# Patient Record
Sex: MALE | Race: OTHER | HISPANIC OR LATINO | Employment: PART TIME | URBAN - METROPOLITAN AREA
[De-identification: names, ages, dates, MRNs, and addresses within clinical notes are randomized per-mention and may not be internally consistent; named-entity substitution may affect disease eponyms.]

---

## 2017-11-28 ENCOUNTER — OFFICE VISIT (OUTPATIENT)
Dept: URGENT CARE | Facility: MEDICAL CENTER | Age: 22
End: 2017-11-28
Payer: COMMERCIAL

## 2017-11-28 PROCEDURE — 94640 AIRWAY INHALATION TREATMENT: CPT

## 2017-11-28 PROCEDURE — G0382 LEV 3 HOSP TYPE B ED VISIT: HCPCS

## 2017-11-29 NOTE — PROGRESS NOTES
Assessment    1  Asthma (493 90) (J45 909)   2  Mild asthma with exacerbation, unspecified whether persistent (493 92) (J45 904)    Plan  Asthma    · Albuterol Sulfate (2 5 MG/3ML) 0 083% Inhalation Nebulization Solution   · PredniSONE 20 MG Oral Tablet   · Sodium Chloride 0 9 % Inhalation Nebulization Solution  Mild asthma with exacerbation, unspecified whether persistent    · PredniSONE 20 MG Oral Tablet; TAKE 3 TABLETS DAILY FOR 3 DAYS, THEN 2TABLETS DAILY FOR 3 DAYS, THEN 1 TABLET DAILY FOR 3 DAYS   · Ventolin  (90 Base) MCG/ACT Inhalation Aerosol Solution; INHALE 1 TO 2PUFFS EVERY 4 TO 6 HOURS AS NEEDED    Discussion/Summary  Discussion Summary:   Patient given albuterol nebulizer treatment in the office, prednisone 40 mg dose  Patient refers to some symptomatic improvement  Re auscultation reveals increased air entry I prescribed Ventolin inhaler 1-2 puffs q 4-6 hours as needed and started on tapering dose of prednisone starting at 60 mg down to 20 mg over 9 days  Strongly advised patient to follow up primary care provider symptoms worsen or go to the emergency room  Medication Side Effects Reviewed: Possible side effects of new medications were reviewed with the patient/guardian today  Chief Complaint    1  Dyspnea  Chief Complaint Free Text Note Form: Patient here stating he feels short of breath, and ran out of his Albuterol inhaler  Also reports sinus congestion  History of Present Illness  HPI: Patient with 1 day history of nasal congestion and shortness of breath  He has a known history of asthma  Recently has been using his rescue inhaler, albuterol every hour with no significant improvement  Now complaining of tightness  Describes some postnasal drip  Denies any fever chills  Patient presented to [facility] earlier this evening however left because of long weight  Last asthma attack was approximately 1 year ago   Upon further questioning, he was recently exposed to his girlfriend who had cold symptoms over the past week  Hospital Based Practices Required Assessment:  Pain Assessment  the patient states they have pain  The pain is located in the chest  (on a scale of 0 to 10, the patient rates the pain at 3 )  Abuse And Domestic Violence Screen   Domestic violence screen not done today  Reason DV Screen not done: not alone   Depression And Suicide Screen  Suicide screen not done today  -- Reason suicide screen not done: not alone  Prefered Language is  [place]  Primary Language is  Slovenian  Review of Systems  Focused-Male:  Constitutional: no fever or chills, feels well, no tiredness, no recent weight loss or weight gain  Cardiovascular: no complaints of slow or fast heart rate, no chest pain, no palpitations, no leg claudication or lower extremity edema  Respiratory: shortness of breath,-- cough-- and-- wheezing  Past Medical History  1  History of asthma (V12 69) (Z87 09)   2  History of seasonal allergies (V15 09) (Z88 9)    Social History     · Nonsmoker (V49 89) (Z78 9)   · Rarely consumes alcohol (V49 89) (Z78 9)  Social History Reviewed: The social history was reviewed and updated today  The social history was reviewed and is unchanged  Current Meds   1  Albuterol Sulfate 1 25 MG/3ML Inhalation Nebulization Solution; Therapy: (Recorded:28Nov2017) to Recorded  Medication List Reviewed: The medication list was reviewed and updated today  Allergies    1  No Known Drug Allergies    Vitals  Signs   Recorded: 28Nov2017 09:44PM   Temperature: 98 4 F, Tympanic  Heart Rate: 110  Respiration: 24  Systolic: 352  Diastolic: 91  Height: 5 ft 5 in  Weight: 240 lb   BMI Calculated: 39 94  BSA Calculated: 2 14  O2 Saturation: 93  Pain Scale: 3    Physical Exam   Constitutional  General appearance: No acute distress, well appearing and well nourished     Ears, Nose, Mouth, and Throat  External inspection of ears and nose: Normal    Otoscopic examination: Tympanic membrance translucent with normal light reflex  Canals patent without erythema  Nasal mucosa, septum, and turbinates: Abnormal  -- Hypertrophic turbinates  Oropharynx: Abnormal  -- Cobblestoning observed  Pulmonary  Respiratory effort: No increased work of breathing or signs of respiratory distress  Auscultation of lungs: Abnormal  -- Decreased air entry with expiratory wheeze  Cardiovascular  Palpation of heart: Normal PMI, no thrills  Auscultation of heart: Normal rate and rhythm, normal S1 and S2, without murmurs  Lymphatic  Palpation of lymph nodes in neck: No lymphadenopathy         Signatures   Electronically signed by : FELA Micehl ; Nov 28 2017 10:28PM EST                       (Author)

## 2018-02-22 ENCOUNTER — APPOINTMENT (EMERGENCY)
Dept: RADIOLOGY | Facility: HOSPITAL | Age: 23
DRG: 133 | End: 2018-02-22
Payer: MEDICARE

## 2018-02-22 ENCOUNTER — HOSPITAL ENCOUNTER (INPATIENT)
Facility: HOSPITAL | Age: 23
LOS: 2 days | Discharge: HOME | DRG: 133 | End: 2018-02-25
Attending: EMERGENCY MEDICINE | Admitting: INTERNAL MEDICINE
Payer: MEDICARE

## 2018-02-22 DIAGNOSIS — J45.41 MODERATE PERSISTENT ASTHMA WITH ACUTE EXACERBATION: ICD-10-CM

## 2018-02-22 DIAGNOSIS — J45.902 STATUS ASTHMATICUS: Primary | ICD-10-CM

## 2018-02-22 PROBLEM — J45.909 ASTHMA: Status: ACTIVE | Noted: 2018-02-22

## 2018-02-22 LAB
ALBUMIN SERPL BCP-MCNC: 4.8 G/DL (ref 3.5–5)
ALP SERPL-CCNC: 107 U/L (ref 46–116)
ALT SERPL W P-5'-P-CCNC: 53 U/L (ref 12–78)
ANION GAP SERPL CALCULATED.3IONS-SCNC: 14 MMOL/L (ref 4–13)
AST SERPL W P-5'-P-CCNC: 27 U/L (ref 5–45)
BASOPHILS # BLD MANUAL: 0 THOUSAND/UL (ref 0–0.1)
BASOPHILS NFR MAR MANUAL: 0 % (ref 0–1)
BILIRUB SERPL-MCNC: 1.11 MG/DL (ref 0.2–1)
BUN SERPL-MCNC: 14 MG/DL (ref 5–25)
CALCIUM SERPL-MCNC: 9.5 MG/DL (ref 8.3–10.1)
CHLORIDE SERPL-SCNC: 103 MMOL/L (ref 100–108)
CO2 SERPL-SCNC: 24 MMOL/L (ref 21–32)
CREAT SERPL-MCNC: 0.99 MG/DL (ref 0.6–1.3)
EOSINOPHIL # BLD MANUAL: 0 THOUSAND/UL (ref 0–0.4)
EOSINOPHIL NFR BLD MANUAL: 0 % (ref 0–6)
ERYTHROCYTE [DISTWIDTH] IN BLOOD BY AUTOMATED COUNT: 13.2 % (ref 11.6–15.1)
GFR SERPL CREATININE-BSD FRML MDRD: 108 ML/MIN/1.73SQ M
GLUCOSE P FAST SERPL-MCNC: 123 MG/DL (ref 65–99)
GLUCOSE SERPL-MCNC: 123 MG/DL (ref 65–140)
HCT VFR BLD AUTO: 51.6 % (ref 36.5–49.3)
HGB BLD-MCNC: 18.5 G/DL (ref 12–17)
LYMPHOCYTES # BLD AUTO: 0.53 THOUSAND/UL (ref 0.6–4.47)
LYMPHOCYTES # BLD AUTO: 4 % (ref 14–44)
MCH RBC QN AUTO: 33.6 PG (ref 26.8–34.3)
MCHC RBC AUTO-ENTMCNC: 35.9 G/DL (ref 31.4–37.4)
MCV RBC AUTO: 94 FL (ref 82–98)
MONOCYTES # BLD AUTO: 0.53 THOUSAND/UL (ref 0–1.22)
MONOCYTES NFR BLD: 4 % (ref 4–12)
NEUTROPHILS # BLD MANUAL: 12.16 THOUSAND/UL (ref 1.85–7.62)
NEUTS BAND NFR BLD MANUAL: 3 % (ref 0–8)
NEUTS SEG NFR BLD AUTO: 89 % (ref 43–75)
NRBC BLD AUTO-RTO: 0 /100 WBCS
PLATELET # BLD AUTO: 195 THOUSANDS/UL (ref 149–390)
PLATELET BLD QL SMEAR: ADEQUATE
PMV BLD AUTO: 11.6 FL (ref 8.9–12.7)
POTASSIUM SERPL-SCNC: 3.8 MMOL/L (ref 3.5–5.3)
PROT SERPL-MCNC: 8.7 G/DL (ref 6.4–8.2)
RBC # BLD AUTO: 5.51 MILLION/UL (ref 3.88–5.62)
RBC MORPH BLD: NORMAL
SODIUM SERPL-SCNC: 141 MMOL/L (ref 136–145)
TOTAL CELLS COUNTED SPEC: 100
WBC # BLD AUTO: 13.22 THOUSAND/UL (ref 4.31–10.16)

## 2018-02-22 PROCEDURE — 93005 ELECTROCARDIOGRAM TRACING: CPT

## 2018-02-22 PROCEDURE — 99285 EMERGENCY DEPT VISIT HI MDM: CPT

## 2018-02-22 PROCEDURE — 36415 COLL VENOUS BLD VENIPUNCTURE: CPT | Performed by: EMERGENCY MEDICINE

## 2018-02-22 PROCEDURE — 85027 COMPLETE CBC AUTOMATED: CPT | Performed by: EMERGENCY MEDICINE

## 2018-02-22 PROCEDURE — 99220 PR INITIAL OBSERVATION CARE/DAY 70 MINUTES: CPT | Performed by: PHYSICIAN ASSISTANT

## 2018-02-22 PROCEDURE — 85007 BL SMEAR W/DIFF WBC COUNT: CPT | Performed by: EMERGENCY MEDICINE

## 2018-02-22 PROCEDURE — 71046 X-RAY EXAM CHEST 2 VIEWS: CPT

## 2018-02-22 PROCEDURE — 80053 COMPREHEN METABOLIC PANEL: CPT | Performed by: EMERGENCY MEDICINE

## 2018-02-22 PROCEDURE — 94760 N-INVAS EAR/PLS OXIMETRY 1: CPT

## 2018-02-22 PROCEDURE — 94644 CONT INHLJ TX 1ST HOUR: CPT

## 2018-02-22 PROCEDURE — 94640 AIRWAY INHALATION TREATMENT: CPT

## 2018-02-22 RX ORDER — ALBUTEROL SULFATE 90 UG/1
2 AEROSOL, METERED RESPIRATORY (INHALATION) EVERY 4 HOURS
Status: ON HOLD | COMMUNITY
Start: 2017-11-30 | End: 2020-12-31 | Stop reason: SDUPTHER

## 2018-02-22 RX ORDER — MAGNESIUM SULFATE 1 G/100ML
1 INJECTION INTRAVENOUS ONCE
Status: COMPLETED | OUTPATIENT
Start: 2018-02-22 | End: 2018-02-22

## 2018-02-22 RX ORDER — ALBUTEROL SULFATE 1.25 MG/3ML
SOLUTION RESPIRATORY (INHALATION)
COMMUNITY
End: 2020-12-30 | Stop reason: CLARIF

## 2018-02-22 RX ORDER — PREDNISONE 20 MG/1
60 TABLET ORAL ONCE
Status: COMPLETED | OUTPATIENT
Start: 2018-02-22 | End: 2018-02-22

## 2018-02-22 RX ORDER — GUAIFENESIN 100 MG/5ML
200 SOLUTION ORAL EVERY 4 HOURS PRN
Status: DISCONTINUED | OUTPATIENT
Start: 2018-02-22 | End: 2018-02-25 | Stop reason: HOSPADM

## 2018-02-22 RX ORDER — IBUPROFEN 600 MG/1
600 TABLET ORAL ONCE
Status: COMPLETED | OUTPATIENT
Start: 2018-02-22 | End: 2018-02-22

## 2018-02-22 RX ORDER — ONDANSETRON 2 MG/ML
4 INJECTION INTRAMUSCULAR; INTRAVENOUS EVERY 6 HOURS PRN
Status: DISCONTINUED | OUTPATIENT
Start: 2018-02-22 | End: 2018-02-25 | Stop reason: HOSPADM

## 2018-02-22 RX ORDER — SODIUM CHLORIDE FOR INHALATION 0.9 %
3 VIAL, NEBULIZER (ML) INHALATION ONCE
Status: COMPLETED | OUTPATIENT
Start: 2018-02-22 | End: 2018-02-22

## 2018-02-22 RX ORDER — ACETAMINOPHEN 325 MG/1
650 TABLET ORAL EVERY 6 HOURS PRN
Status: DISCONTINUED | OUTPATIENT
Start: 2018-02-22 | End: 2018-02-25 | Stop reason: HOSPADM

## 2018-02-22 RX ORDER — LEVALBUTEROL 1.25 MG/.5ML
1.25 SOLUTION, CONCENTRATE RESPIRATORY (INHALATION) EVERY 6 HOURS PRN
Status: DISCONTINUED | OUTPATIENT
Start: 2018-02-22 | End: 2018-02-23

## 2018-02-22 RX ORDER — METHYLPREDNISOLONE SODIUM SUCCINATE 40 MG/ML
40 INJECTION, POWDER, LYOPHILIZED, FOR SOLUTION INTRAMUSCULAR; INTRAVENOUS EVERY 6 HOURS SCHEDULED
Status: DISCONTINUED | OUTPATIENT
Start: 2018-02-22 | End: 2018-02-23

## 2018-02-22 RX ADMIN — ALBUTEROL SULFATE 10 MG: 2.5 SOLUTION RESPIRATORY (INHALATION) at 14:39

## 2018-02-22 RX ADMIN — ACETAMINOPHEN 650 MG: 325 TABLET, FILM COATED ORAL at 20:25

## 2018-02-22 RX ADMIN — IPRATROPIUM BROMIDE 1 MG: 0.5 SOLUTION RESPIRATORY (INHALATION) at 14:40

## 2018-02-22 RX ADMIN — IPRATROPIUM BROMIDE 0.5 MG: 0.5 SOLUTION RESPIRATORY (INHALATION) at 22:29

## 2018-02-22 RX ADMIN — GUAIFENESIN 200 MG: 100 SOLUTION ORAL at 22:10

## 2018-02-22 RX ADMIN — PREDNISONE 60 MG: 20 TABLET ORAL at 14:25

## 2018-02-22 RX ADMIN — LEVALBUTEROL 1.25 MG: 1.25 SOLUTION, CONCENTRATE RESPIRATORY (INHALATION) at 22:29

## 2018-02-22 RX ADMIN — ALBUTEROL SULFATE 10 MG: 2.5 SOLUTION RESPIRATORY (INHALATION) at 19:07

## 2018-02-22 RX ADMIN — METHYLPREDNISOLONE SODIUM SUCCINATE 40 MG: 40 INJECTION, POWDER, FOR SOLUTION INTRAMUSCULAR; INTRAVENOUS at 20:25

## 2018-02-22 RX ADMIN — MAGNESIUM SULFATE HEPTAHYDRATE 1 G: 1 INJECTION, SOLUTION INTRAVENOUS at 18:03

## 2018-02-22 RX ADMIN — SODIUM CHLORIDE 1000 ML: 0.9 INJECTION, SOLUTION INTRAVENOUS at 18:02

## 2018-02-22 RX ADMIN — IPRATROPIUM BROMIDE 1 MG: 0.5 SOLUTION RESPIRATORY (INHALATION) at 19:07

## 2018-02-22 RX ADMIN — ISODIUM CHLORIDE 3 ML: 0.03 SOLUTION RESPIRATORY (INHALATION) at 19:07

## 2018-02-22 RX ADMIN — IBUPROFEN 600 MG: 600 TABLET, FILM COATED ORAL at 15:55

## 2018-02-22 NOTE — ED NOTES
Pt SpO2 90% on RA, 2LPM via NC applied at this time, pt instructed to inhale through nose, acknowledged understanding        Hannah Schreiber RN  02/22/18 0917

## 2018-02-22 NOTE — ED PROVIDER NOTES
History  Chief Complaint   Patient presents with    Asthma     per ems, patient has been experiencing asthma exacerbations since lastnight  has been taking albuterol nebulizer rx at home with little relief  patient denies prior intubations  Pt  Has a hx of asthma - his breathing /wheezing became worse today but had some cough and wheezing over the past few days  No fevers, pt  Doesn't smoke  He was admitted 2 months ago for asthma exacerbation but never had to be intubated            Prior to Admission Medications   Prescriptions Last Dose Informant Patient Reported? Taking? albuterol (ACCUNEB) 1 25 MG/3ML nebulizer solution   Yes No   Sig: Inhale   albuterol (PROVENTIL HFA,VENTOLIN HFA) 90 mcg/act inhaler   Yes Yes   Sig: Inhale 2 puffs every 4 (four) hours      Facility-Administered Medications: None       Past Medical History:   Diagnosis Date    Asthma        History reviewed  No pertinent surgical history  History reviewed  No pertinent family history  I have reviewed and agree with the history as documented  Social History   Substance Use Topics    Smoking status: Current Some Day Smoker    Smokeless tobacco: Not on file    Alcohol use Yes      Comment: occasional         Review of Systems   Constitutional: Negative for appetite change, fatigue and fever  HENT: Negative for rhinorrhea and sore throat  Respiratory: Positive for cough, shortness of breath and wheezing  Cardiovascular: Negative for chest pain and leg swelling  Gastrointestinal: Negative for abdominal pain, diarrhea and vomiting  Genitourinary: Negative for dysuria and flank pain  Musculoskeletal: Negative for back pain and neck pain  Skin: Negative for rash  Neurological: Negative for syncope and headaches     Psychiatric/Behavioral:        Mood normal       Physical Exam  ED Triage Vitals   Temperature Pulse Respirations Blood Pressure SpO2   02/22/18 1409 02/22/18 1409 02/22/18 1409 02/22/18 1409 02/22/18 1409   98 °F (36 7 °C) (!) 136 (!) 32 151/61 97 %      Temp Source Heart Rate Source Patient Position - Orthostatic VS BP Location FiO2 (%)   02/22/18 1409 02/22/18 1409 02/22/18 1409 02/22/18 1550 --   Temporal Monitor Lying Left arm       Pain Score       02/22/18 1550       7           Orthostatic Vital Signs  Vitals:    02/24/18 0901 02/24/18 1558 02/24/18 2313 02/25/18 0755   BP: 129/58 124/59 136/87 118/67   Pulse: 93 81 103 84   Patient Position - Orthostatic VS: Lying Lying Lying Lying       Physical Exam   Constitutional: He is oriented to person, place, and time  He appears well-developed and well-nourished  HENT:   Head: Normocephalic and atraumatic  Neck: Normal range of motion  Neck supple  Cardiovascular: Normal rate and regular rhythm  Pulmonary/Chest: No respiratory distress  +exp  wheezing   Abdominal: Soft  There is no tenderness  Musculoskeletal: Normal range of motion  Neurological: He is alert and oriented to person, place, and time  Skin: Skin is warm and dry  Nursing note and vitals reviewed        ED Medications  Medications    EMS REPLENISHMENT MED ( Does not apply Given to EMS 2/22/18 1420)   albuterol CONTINUOUS nebulizing solution (10 mg Nebulization Given 2/22/18 1439)   ipratropium (ATROVENT) 0 02 % inhalation solution 1 mg (1 mg Nebulization Given 2/22/18 1440)   predniSONE tablet 60 mg (60 mg Oral Given 2/22/18 1425)   ibuprofen (MOTRIN) tablet 600 mg (600 mg Oral Given 2/22/18 1555)   sodium chloride 0 9 % bolus 1,000 mL (0 mL Intravenous Stopped 2/22/18 1844)   magnesium sulfate IVPB (premix) SOLN 1 g (0 g Intravenous Stopped 2/22/18 1844)   albuterol inhalation solution 10 mg (10 mg Nebulization Given 2/22/18 1907)   ipratropium (ATROVENT) 0 02 % inhalation solution 1 mg (1 mg Nebulization Given 2/22/18 1907)   sodium chloride 0 9 % inhalation solution 3 mL (3 mL Nebulization Given 2/22/18 1907)   ketorolac (TORADOL) injection 15 mg (15 mg Intravenous Given 2/23/18 0058)   azithromycin (ZITHROMAX) tablet 500 mg (500 mg Oral Given 2/23/18 8992)       Diagnostic Studies  Results Reviewed     Procedure Component Value Units Date/Time    Basic metabolic panel [[id number]] Collected:  02/23/18 0602    Lab Status:  Final result Specimen:  Blood from Arm, Left Updated:  02/23/18 6426     Sodium 139 mmol/L      Potassium 4 1 mmol/L      Chloride 104 mmol/L      CO2 22 mmol/L      Anion Gap 13 mmol/L      BUN 16 mg/dL      Creatinine 0 78 mg/dL      Glucose 137 mg/dL      Calcium 9 1 mg/dL      eGFR 128 ml/min/1 73sq m     Narrative:         National Kidney Disease Education Program recommendations are as follows:  GFR calculation is accurate only with a steady state creatinine  Chronic Kidney disease less than 60 ml/min/1 73 sq  meters  Kidney failure less than 15 ml/min/1 73 sq  meters  CBC (With Platelets) [[id number]]  (Abnormal) Collected:  02/23/18 0602    Lab Status:  Final result Specimen:  Blood from Arm, Left Updated:  02/23/18 0702     WBC 13 29 (H) Thousand/uL      RBC 5 22 Million/uL      Hemoglobin 17 6 (H) g/dL      Hematocrit 49 5 (H) %      MCV 95 fL      MCH 33 7 pg      MCHC 35 6 g/dL      RDW 13 4 %      Platelets 278 Thousands/uL      MPV 11 7 fL     CBC and differential [[id number]]  (Abnormal) Collected:  02/22/18 1759    Lab Status:  Final result Specimen:  Blood from Arm, Right Updated:  02/22/18 1843     WBC 13 22 (H) Thousand/uL      RBC 5 51 Million/uL      Hemoglobin 18 5 (H) g/dL      Hematocrit 51 6 (H) %      MCV 94 fL      MCH 33 6 pg      MCHC 35 9 g/dL      RDW 13 2 %      MPV 11 6 fL      Platelets 519 Thousands/uL      nRBC 0 /100 WBCs     Narrative: This is an appended report  These results have been appended to a previously verified report      Comprehensive metabolic panel [[id number]]  (Abnormal) Collected:  02/22/18 1759    Lab Status:  Final result Specimen:  Blood from Arm, Right Updated:  02/22/18 1826     Sodium 141 mmol/L Potassium 3 8 mmol/L      Chloride 103 mmol/L      CO2 24 mmol/L      Anion Gap 14 (H) mmol/L      BUN 14 mg/dL      Creatinine 0 99 mg/dL      Glucose 123 mg/dL      Glucose, Fasting 123 (H) mg/dL      Calcium 9 5 mg/dL      AST 27 U/L      ALT 53 U/L      Alkaline Phosphatase 107 U/L      Total Protein 8 7 (H) g/dL      Albumin 4 8 g/dL      Total Bilirubin 1 11 (H) mg/dL      eGFR 108 ml/min/1 73sq m     Narrative:         National Kidney Disease Education Program recommendations are as follows:  GFR calculation is accurate only with a steady state creatinine  Chronic Kidney disease less than 60 ml/min/1 73 sq  meters  Kidney failure less than 15 ml/min/1 73 sq  meters  XR chest 2 views   Final Result by Felicia Franco MD (02/22 1711)      Large retrosternal airspace with flattening of the hemidiaphragms could relate to hyperinflated lungs related to small airways disease/asthma  Workstation performed: [id number]                    Procedures  Procedures       Phone Contacts  ED Phone Contact    ED Course  ED Course                                MDM  Number of Diagnoses or Management Options  Status asthmaticus:      Amount and/or Complexity of Data Reviewed  Clinical lab tests: ordered and reviewed  Tests in the radiology section of CPT®: ordered and reviewed    Risk of Complications, Morbidity, and/or Mortality  Presenting problems: moderate  General comments: Pt   Admitted for status asthmaticus, still wheezing a lot, pulse ox 90% RA      CritCare Time    Disposition  Final diagnoses:   Status asthmaticus     Time reflects when diagnosis was documented in both MDM as applicable and the Disposition within this note     Time User Action Codes Description Comment    2/22/2018  5:51 PM Taniya Avila Add [J45 902] Status asthmaticus     2/25/2018  9:23 AM Nathanael Pina Add [J45 41] Moderate persistent asthma with acute exacerbation       ED Disposition     ED Disposition Condition Comment    Admit  Case was discussed with LESLEE and the patient's admission status was agreed to be observation/med surg  Follow-up Information     Follow up With Specialties Details Why Contact Info    Grey Hammond MD Internal Medicine Follow up  [address], Jaycob CASTRO [address]      Cuca Matthew,  Pulmonary Disease, Pulmonology Follow up  [address] [place] [address]  [phone]          Discharge Medication List as of 2/25/2018 10:54 AM      START taking these medications    Details   azithromycin (ZITHROMAX) 250 mg tablet Take 1 tablet (250 mg total) by mouth every 24 hours for 3 doses, Starting Sun 2/25/2018, Until Wed 2/28/2018, Print      cetirizine (ZyrTEC) 10 mg tablet Take 1 tablet (10 mg total) by mouth daily, Starting Sun 2/25/2018, Print      guaiFENesin (ROBITUSSIN) 100 mg/5 mL oral solution Take 10 mL (200 mg total) by mouth every 4 (four) hours as needed (Cough), Starting Sun 2/25/2018, Print      predniSONE 10 mg tablet 40 mg daily for 3 days followed by 30 mg daily for 3 days followed by 20 mg daily for 3 days then 10 mg daily for 3 days then stop, Print         CONTINUE these medications which have NOT CHANGED    Details   albuterol (PROVENTIL HFA,VENTOLIN HFA) 90 mcg/act inhaler Inhale 2 puffs every 4 (four) hours, Starting u 11/30/2017, Historical Med      albuterol (ACCUNEB) 1 25 MG/3ML nebulizer solution Inhale, Historical Med           No discharge procedures on file      ED Provider  Electronically Signed by           Nivia Dixon MD  02/28/18 3643

## 2018-02-23 LAB
ANION GAP SERPL CALCULATED.3IONS-SCNC: 13 MMOL/L (ref 4–13)
ATRIAL RATE: 137 BPM
BUN SERPL-MCNC: 16 MG/DL (ref 5–25)
CALCIUM SERPL-MCNC: 9.1 MG/DL (ref 8.3–10.1)
CHLORIDE SERPL-SCNC: 104 MMOL/L (ref 100–108)
CO2 SERPL-SCNC: 22 MMOL/L (ref 21–32)
CREAT SERPL-MCNC: 0.78 MG/DL (ref 0.6–1.3)
ERYTHROCYTE [DISTWIDTH] IN BLOOD BY AUTOMATED COUNT: 13.4 % (ref 11.6–15.1)
GFR SERPL CREATININE-BSD FRML MDRD: 128 ML/MIN/1.73SQ M
GLUCOSE SERPL-MCNC: 137 MG/DL (ref 65–140)
HCT VFR BLD AUTO: 49.5 % (ref 36.5–49.3)
HGB BLD-MCNC: 17.6 G/DL (ref 12–17)
MCH RBC QN AUTO: 33.7 PG (ref 26.8–34.3)
MCHC RBC AUTO-ENTMCNC: 35.6 G/DL (ref 31.4–37.4)
MCV RBC AUTO: 95 FL (ref 82–98)
P AXIS: 75 DEGREES
PLATELET # BLD AUTO: 209 THOUSANDS/UL (ref 149–390)
PMV BLD AUTO: 11.7 FL (ref 8.9–12.7)
POTASSIUM SERPL-SCNC: 4.1 MMOL/L (ref 3.5–5.3)
PR INTERVAL: 144 MS
QRS AXIS: 99 DEGREES
QRSD INTERVAL: 68 MS
QT INTERVAL: 282 MS
QTC INTERVAL: 425 MS
RBC # BLD AUTO: 5.22 MILLION/UL (ref 3.88–5.62)
SODIUM SERPL-SCNC: 139 MMOL/L (ref 136–145)
T WAVE AXIS: 59 DEGREES
VENTRICULAR RATE: 137 BPM
WBC # BLD AUTO: 13.29 THOUSAND/UL (ref 4.31–10.16)

## 2018-02-23 PROCEDURE — 85027 COMPLETE CBC AUTOMATED: CPT | Performed by: PHYSICIAN ASSISTANT

## 2018-02-23 PROCEDURE — 93010 ELECTROCARDIOGRAM REPORT: CPT | Performed by: INTERNAL MEDICINE

## 2018-02-23 PROCEDURE — 94760 N-INVAS EAR/PLS OXIMETRY 1: CPT

## 2018-02-23 PROCEDURE — 94640 AIRWAY INHALATION TREATMENT: CPT

## 2018-02-23 PROCEDURE — 99222 1ST HOSP IP/OBS MODERATE 55: CPT | Performed by: INTERNAL MEDICINE

## 2018-02-23 PROCEDURE — 80048 BASIC METABOLIC PNL TOTAL CA: CPT | Performed by: PHYSICIAN ASSISTANT

## 2018-02-23 PROCEDURE — 99231 SBSQ HOSP IP/OBS SF/LOW 25: CPT | Performed by: INTERNAL MEDICINE

## 2018-02-23 RX ORDER — BUDESONIDE AND FORMOTEROL FUMARATE DIHYDRATE 160; 4.5 UG/1; UG/1
2 AEROSOL RESPIRATORY (INHALATION)
Status: DISCONTINUED | OUTPATIENT
Start: 2018-02-23 | End: 2018-02-25 | Stop reason: HOSPADM

## 2018-02-23 RX ORDER — AZITHROMYCIN 250 MG/1
500 TABLET, FILM COATED ORAL EVERY 24 HOURS
Status: COMPLETED | OUTPATIENT
Start: 2018-02-23 | End: 2018-02-23

## 2018-02-23 RX ORDER — KETOROLAC TROMETHAMINE 30 MG/ML
15 INJECTION, SOLUTION INTRAMUSCULAR; INTRAVENOUS ONCE
Status: COMPLETED | OUTPATIENT
Start: 2018-02-23 | End: 2018-02-23

## 2018-02-23 RX ORDER — GUAIFENESIN 600 MG
600 TABLET, EXTENDED RELEASE 12 HR ORAL EVERY 12 HOURS SCHEDULED
Status: DISCONTINUED | OUTPATIENT
Start: 2018-02-23 | End: 2018-02-25 | Stop reason: HOSPADM

## 2018-02-23 RX ORDER — AZITHROMYCIN 250 MG/1
250 TABLET, FILM COATED ORAL EVERY 24 HOURS
Status: DISCONTINUED | OUTPATIENT
Start: 2018-02-24 | End: 2018-02-25 | Stop reason: HOSPADM

## 2018-02-23 RX ORDER — ALBUTEROL SULFATE 90 UG/1
2 AEROSOL, METERED RESPIRATORY (INHALATION) EVERY 4 HOURS PRN
Status: DISCONTINUED | OUTPATIENT
Start: 2018-02-23 | End: 2018-02-25 | Stop reason: HOSPADM

## 2018-02-23 RX ORDER — FLUTICASONE PROPIONATE 50 MCG
1 SPRAY, SUSPENSION (ML) NASAL DAILY
Status: DISCONTINUED | OUTPATIENT
Start: 2018-02-23 | End: 2018-02-25 | Stop reason: HOSPADM

## 2018-02-23 RX ORDER — LEVALBUTEROL 1.25 MG/.5ML
1.25 SOLUTION, CONCENTRATE RESPIRATORY (INHALATION) EVERY 6 HOURS PRN
Status: DISCONTINUED | OUTPATIENT
Start: 2018-02-23 | End: 2018-02-23

## 2018-02-23 RX ORDER — METHYLPREDNISOLONE SODIUM SUCCINATE 40 MG/ML
40 INJECTION, POWDER, LYOPHILIZED, FOR SOLUTION INTRAMUSCULAR; INTRAVENOUS EVERY 12 HOURS SCHEDULED
Status: DISCONTINUED | OUTPATIENT
Start: 2018-02-23 | End: 2018-02-25 | Stop reason: HOSPADM

## 2018-02-23 RX ORDER — LEVALBUTEROL 1.25 MG/.5ML
1.25 SOLUTION, CONCENTRATE RESPIRATORY (INHALATION)
Status: DISCONTINUED | OUTPATIENT
Start: 2018-02-23 | End: 2018-02-25 | Stop reason: HOSPADM

## 2018-02-23 RX ADMIN — IPRATROPIUM BROMIDE 0.5 MG: 0.5 SOLUTION RESPIRATORY (INHALATION) at 14:24

## 2018-02-23 RX ADMIN — IPRATROPIUM BROMIDE 0.5 MG: 0.5 SOLUTION RESPIRATORY (INHALATION) at 20:33

## 2018-02-23 RX ADMIN — METHYLPREDNISOLONE SODIUM SUCCINATE 40 MG: 40 INJECTION, POWDER, FOR SOLUTION INTRAMUSCULAR; INTRAVENOUS at 20:15

## 2018-02-23 RX ADMIN — IPRATROPIUM BROMIDE 0.5 MG: 0.5 SOLUTION RESPIRATORY (INHALATION) at 08:28

## 2018-02-23 RX ADMIN — METHYLPREDNISOLONE SODIUM SUCCINATE 40 MG: 40 INJECTION, POWDER, FOR SOLUTION INTRAMUSCULAR; INTRAVENOUS at 11:24

## 2018-02-23 RX ADMIN — LEVALBUTEROL HYDROCHLORIDE 1.25 MG: 1.25 SOLUTION, CONCENTRATE RESPIRATORY (INHALATION) at 08:28

## 2018-02-23 RX ADMIN — IPRATROPIUM BROMIDE 0.5 MG: 0.5 SOLUTION RESPIRATORY (INHALATION) at 01:10

## 2018-02-23 RX ADMIN — AZITHROMYCIN 500 MG: 250 TABLET, FILM COATED ORAL at 14:52

## 2018-02-23 RX ADMIN — ALBUTEROL SULFATE 2 PUFF: 90 AEROSOL, METERED RESPIRATORY (INHALATION) at 18:18

## 2018-02-23 RX ADMIN — METHYLPREDNISOLONE SODIUM SUCCINATE 40 MG: 40 INJECTION, POWDER, FOR SOLUTION INTRAMUSCULAR; INTRAVENOUS at 00:01

## 2018-02-23 RX ADMIN — BUDESONIDE AND FORMOTEROL FUMARATE DIHYDRATE 2 PUFF: 160; 4.5 AEROSOL RESPIRATORY (INHALATION) at 20:15

## 2018-02-23 RX ADMIN — LEVALBUTEROL HYDROCHLORIDE 1.25 MG: 1.25 SOLUTION, CONCENTRATE RESPIRATORY (INHALATION) at 01:11

## 2018-02-23 RX ADMIN — BUDESONIDE AND FORMOTEROL FUMARATE DIHYDRATE 2 PUFF: 160; 4.5 AEROSOL RESPIRATORY (INHALATION) at 11:24

## 2018-02-23 RX ADMIN — KETOROLAC TROMETHAMINE 15 MG: 30 INJECTION, SOLUTION INTRAMUSCULAR at 00:58

## 2018-02-23 RX ADMIN — LEVALBUTEROL HYDROCHLORIDE 1.25 MG: 1.25 SOLUTION, CONCENTRATE RESPIRATORY (INHALATION) at 20:33

## 2018-02-23 RX ADMIN — ALBUTEROL SULFATE 2 PUFF: 90 AEROSOL, METERED RESPIRATORY (INHALATION) at 05:28

## 2018-02-23 RX ADMIN — LEVALBUTEROL HYDROCHLORIDE 1.25 MG: 1.25 SOLUTION, CONCENTRATE RESPIRATORY (INHALATION) at 14:24

## 2018-02-23 RX ADMIN — FLUTICASONE PROPIONATE 1 SPRAY: 50 SPRAY, METERED NASAL at 11:24

## 2018-02-23 RX ADMIN — GUAIFENESIN 600 MG: 600 TABLET, EXTENDED RELEASE ORAL at 11:22

## 2018-02-23 RX ADMIN — METHYLPREDNISOLONE SODIUM SUCCINATE 40 MG: 40 INJECTION, POWDER, FOR SOLUTION INTRAMUSCULAR; INTRAVENOUS at 05:29

## 2018-02-23 RX ADMIN — GUAIFENESIN 600 MG: 600 TABLET, EXTENDED RELEASE ORAL at 20:15

## 2018-02-23 RX ADMIN — ALBUTEROL SULFATE 2 PUFF: 90 AEROSOL, METERED RESPIRATORY (INHALATION) at 00:44

## 2018-02-23 RX ADMIN — ENOXAPARIN SODIUM 40 MG: 40 INJECTION SUBCUTANEOUS at 09:27

## 2018-02-23 NOTE — CASE MANAGEMENT
Initial Clinical Review    Admission: Date/Time/Statement:   OBS  ORDER    2/22  @   1751        IP   ORDER  ENTERED    2/23  @  1246        ED: Date/Time/Mode of Arrival:   ED Arrival Information     Expected Arrival Acuity Means of Arrival Escorted By Service Admission Type    - 2/22/2018 14:03 Urgent Ambulance [facility] General Medicine Urgent    Arrival Complaint    asthma          Chief Complaint:   Chief Complaint   Patient presents with    Asthma     per ems, patient has been experiencing asthma exacerbations since lastnight  has been taking albuterol nebulizer rx at home with little relief  patient denies prior intubations  History of Illness:    Conchis Valentin is a 25 y o  male who presents with shortness of breath for 1 day  He started with a cough yesterday  He denies sick contacts  He was moving things around in his house and it may have been david  He denies any fever  He was using his inhalers without relief  He has required hospitalization for his asthma in the past     Review of Systems:       ED Vital Signs:   ED Triage Vitals   Temperature Pulse Respirations Blood Pressure SpO2   02/22/18 1409 02/22/18 1409 02/22/18 1409 02/22/18 1409 02/22/18 1409   98 °F (36 7 °C) (!) 136 (!) 32 151/61 97 %      Temp Source Heart Rate Source Patient Position - Orthostatic VS BP Location FiO2 (%)   02/22/18 1409 02/22/18 1409 02/22/18 1409 02/22/18 1550 --   Temporal Monitor Lying Left arm       Pain Score       02/22/18 1550       7        Wt Readings from Last 1 Encounters:   12/30/16 93 kg (205 lb)       Vital Signs (abnormal):    above    Abnormal Labs/Diagnostic Test Results:    WBC   13 22  H/H  18 5/51 6  AG  14  Total  Bili   1 11  CXR:       Large retrosternal airspace with flattening of the hemidiaphragms could relate to hyperinflated lungs related to small airways disease/asthma      Workstation performed: [id number]            Narrative:               ED Treatment:   Medication Administration from 02/22/2018 1403 to 02/22/2018 1952       Date/Time Order Dose Route Action Action by Comments     02/22/2018 1420  EMS REPLENISHMENT MED 0  Does not apply Given to EMS Angelo Bailey, RN      02/22/2018 1439 albuterol CONTINUOUS nebulizing solution 10 mg Nebulization Given Geovanni Vaughan, RT      02/22/2018 1440 ipratropium (ATROVENT) 0 02 % inhalation solution 1 mg 1 mg Nebulization Given Geovanni Vaughan, RT      02/22/2018 1425 predniSONE tablet 60 mg 60 mg Oral Given Angelo Bailey, RN      02/22/2018 1555 ibuprofen (MOTRIN) tablet 600 mg 600 mg Oral Given Alivia Negrete, RN      02/22/2018 1844 sodium chloride 0 9 % bolus 1,000 mL 0 mL Intravenous Stopped Duy Green RN      02/22/2018 1802 sodium chloride 0 9 % bolus 1,000 mL 1,000 mL Intravenous New Bag Duy Green RN      02/22/2018 1844 magnesium sulfate IVPB (premix) SOLN 1 g 0 g Intravenous Stopped Duy Green RN      02/22/2018 1803 magnesium sulfate IVPB (premix) SOLN 1 g 1 g Intravenous [address] Duy Green RN      02/22/2018 1907 albuterol inhalation solution 10 mg 10 mg Nebulization Given Juarez Ceballos Batch, RT      02/22/2018 1907 ipratropium (ATROVENT) 0 02 % inhalation solution 1 mg 1 mg Nebulization Given Juarez Ceballos Batch, RT      02/22/2018 1907 sodium chloride 0 9 % inhalation solution 3 mL 3 mL Nebulization Given Tato Garcia, RT           Past Medical/Surgical History: Active Ambulatory Problems     Diagnosis Date Noted    No Active Ambulatory Problems     Resolved Ambulatory Problems     Diagnosis Date Noted    No Resolved Ambulatory Problems     Past Medical History:   Diagnosis Date    Asthma        Admitting Diagnosis: Status asthmaticus [J45 902]  Asthma [J45 909]    Age/Sex: 25 y o  male    · Assessment/Plan:    asthma  ? Admit patient to med/surg  Given PO steroids in ED, will give IV also  Continue nebs  Consult pulmonology   No signs of pneumonia on CXR           VTE Prophylaxis: Enoxaparin (Lovenox)  / sequential compression device   Code Status: full code  POLST: There is no POLST form on file for this patient (pre-hospital)     Anticipated Length of Stay:  Patient will be admitted on an Observation basis with an anticipated length of stay of  Less than 2 midnights  Justification for Hospital Stay: patient requires IV steroids and nebs    Admission Orders:    OBS  ORDER     2/22   @   1751      IP ORDER  ENTERED     2/23  @  1246  Scheduled Meds:   Current Facility-Administered Medications:  acetaminophen 650 mg Oral Q6H PRN Kyra An PA-C   albuterol 2 puff Inhalation Q4H PRN Demarco Duran MD   enoxaparin 40 mg Subcutaneous Daily Kyra An PA-C   guaiFENesin 200 mg Oral Q4H PRN GABRIELLA Fierro   levalbuterol 1 25 mg Nebulization Q6H Demarco Duran MD   And       ipratropium 0 5 mg Nebulization Q6H Demarco Duran MD   methylPREDNISolone sodium succinate 40 mg Intravenous Q6H [facility] Kyra An PA-C   ondansetron 4 mg Intravenous Q6H PRN Kyra An PA-C     Continuous Infusions:    PRN Meds:   acetaminophen    albuterol    guaiFENesin    ondansetron     Reg diet  Cons pulmonary  O2  6L    Nursing progress note  2/22   11 PM  Pt continues to have to be tachypnec, with labored breathing with increased work effort  Pt oxygen sat at 91% on 6L NC  Spoke with SLIM SANDRA Darden regarding pt condition  Respiratory therapist called and came to assess pt  PRN inhaler ordered and frequency of neb treatments increased  Awaiting inhaler to arrive from pharmacy  Will continue to monitor pt  1  PER  PULMONARY CONSULT:  Asthma with acute Exacerbation   2  Acute Pulmonary Insufficiency   · Wean oxygen as tolerated  Patient does not wear oxygen at baseline  Continue to maintain SPO2 > 88%  · Pulmonary Toilet, ISQ1H, OOB as tolerated, PT/OT, Increase activity as able  Acapella      2  Asthma with Acute Exacerbation   · Continue Solumedrol at 40 mg IV Q6H for today   Likely Decrease and continue taper in the next 24-48 hours   · Continue Xopenex and Atorvent Q6H   · Start Symbicort BID   · Once tapered off the steroids he should have IGE and [address]  · He should follow up in the Pulmonary office in 4-6 weeks for Asthma follow up  Recommend PFTS as an outpatient      3  Acute Tracheobronchitis   · Likely Viral, continue with supportive care  · Mucinex BID  · Flonase  · Steroids, Nebulizers, and Symbicort      4  Allergic Rhinitis   · Follow up as an outpatient for IGE and [address] [place] [address] Blvd panel  · Reports he previously saw an allergy / asthma specialist while living in [place]  The patient will continue to require additional inpatient hospital stay due to Need for IV steroids    Respiratory: Positive for cough, chest tightness, shortness of breath and wheezing  Negative for apnea, choking and stridor  Pulmonary/Chest: Effort normal and breath sounds normal  No stridor  No respiratory distress  He exhibits no tenderness  Inspiratory and Expiratory wheezes noted throughout all Lung Fields  Good Air Movement auscultated in all lung fields    CXR:Large retrosternal airspace with flattening of the hemidiaphragms could relate to hyperinflated lungs related to small airways disease/asthma      Thank you,  [address] [facility] in the [facility] by James Hernandez for 2017  Network Utilization Review Department  Phone: [phone]; Fax [phone]  ATTENTION: The Network Utilization Review Department is now centralized for our 7 Facilities  Make a note that we have a new phone and fax numbers for our Department  Please call with any questions or concerns to [phone] and carefully follow the prompts so that you are directed to the right person  All voicemails are confidential  Fax any determinations, approvals, denials, and requests for initial or continue stay review clinical to [phone]   Due to HIGH CALL volume, it would be easier if you could please send faxed requests to expedite your requests and in part, help us provide discharge notifications faster

## 2018-02-23 NOTE — ED NOTES
Called at 19:19 to give report   Requested to call back in 5 minutes   Receiving RN unavailable     Cat Moreno RN  02/22/18 0166

## 2018-02-23 NOTE — PROGRESS NOTES
[facility] Progress Note  Patient: Reyes Wills 25 y o  male   MRN: [id number]  PCP: Flavia Barrios MD  Unit/Bed#: [place] [address] [address] [place] [address] - Encounter: [id number]  Date Of Visit: 18      Subjective:   Patient is still coughing, expectoration thing yellowish green phlegm  Still wheezing  Still short of breath  No chest pain or palpitations  Afebrile  Physical Exam:     Vitals:   Temp (24hrs), Av 4 °F (36 9 °C), Min:98 °F (36 7 °C), Max:99 1 °F (37 3 °C)    HR:  [102-136] 102  Resp:  [14-32] 20  BP: (127-156)/(61-82) 133/82  SpO2:  [90 %-97 %] 96 %  There is no height or weight on file to calculate BMI      H&N: Anicteric sclera, supple neck  Chest coarse breathing with expiratory wheezing  Heart: S1, S2 RRR  Abd: soft, Non tender, non distended  Extremities: No oedema, clubbing or cyanosis  Skin: Intact, no rash  Input and Output Summary (last 24 hours): Intake/Output Summary (Last 24 hours) at 18 0943  Last data filed at 18   Gross per 24 hour   Intake             1100 ml   Output              600 ml   Net              500 ml       Assessment:    Principal Problem:    Asthma      Plan:  Asthma exacerbation  Continue IV Solu-Medrol, Mucinex and neb treatments  Patient is coughing thick greenish phlegm, will start IV Zithromax  Continue oxygen treatment titrating P O2 above 90%  Further recommendations will depend on clinical progression of the case  VTE Pharmacologic Prophylaxis:   Pharmacologic: Enoxaparin (Lovenox)  Mechanical VTE Prophylaxis in Place: Yes    Education and Discussions with Family / Patient:  Patient    Time Spent for Care: 20 minutes  More than 50% of total time spent on counseling and coordination of care as described above      Current Length of Stay: 0 day(s)    Current Patient Status: Observation   Certification Statement: The patient will continue to require additional inpatient hospital stay due to Need for IV steroids    Discharge Plan:  Home    Code Status: Level 1 - Full Code    Additional Data:     Labs:      Results from last 7 days  Lab Units 02/23/18  0602 02/22/18  1759   WBC Thousand/uL 13 29* 13 22*   HEMOGLOBIN g/dL 17 6* 18 5*   HEMATOCRIT % 49 5* 51 6*   PLATELETS Thousands/uL 209 195   LYMPHO PCT %  --  4*   MONO PCT MAN %  --  4   EOSINO PCT MANUAL %  --  0       Results from last 7 days  Lab Units 02/23/18  0602 02/22/18  1759   SODIUM mmol/L 139 141   POTASSIUM mmol/L 4 1 3 8   CHLORIDE mmol/L 104 103   CO2 mmol/L 22 24   BUN mg/dL 16 14   CREATININE mg/dL 0 78 0 99   CALCIUM mg/dL 9 1 9 5   TOTAL PROTEIN g/dL  --  8 7*   BILIRUBIN TOTAL mg/dL  --  1 11*   ALK PHOS U/L  --  107   ALT U/L  --  53   AST U/L  --  27   GLUCOSE RANDOM mg/dL 137 123           * I Have Reviewed All Lab Data Listed Above  * Additional Pertinent Lab Tests Reviewed: [facility]    Recent Cultures (last 7 days):           Last 24 Hours Medication List:     Current Facility-Administered Medications:  acetaminophen 650 mg Oral Q6H PRN Douglas Almanzar PA-C   albuterol 2 puff Inhalation Q4H PRN Zain Luna MD   enoxaparin 40 mg Subcutaneous Daily Douglas Almanzar PA-C   guaiFENesin 200 mg Oral Q4H PRN Blake Given GABRIELLA Lee   levalbuterol 1 25 mg Nebulization Q6H Zain Luna MD   And       ipratropium 0 5 mg Nebulization Q6H Zain Luna MD   methylPREDNISolone sodium succinate 40 mg Intravenous Q6H [facility] Douglas Almanzar PA-C   ondansetron 4 mg Intravenous Q6H PRN Douglas Almanzar PA-C        Today, Patient Was Seen By: Josue Naranjo MD    ** Please Note: Dragon 360 Dictation voice to text software may have been used in the creation of this document   **

## 2018-02-23 NOTE — PROGRESS NOTES
Pt continues to have to be tachypnec, with labored breathing with increased work effort  Pt oxygen sat at 91% on 6L NC  Spoke with LESLEE Darden regarding pt condition  Respiratory therapist called and came to assess pt  PRN inhaler ordered and frequency of neb treatments increased  Awaiting inhaler to arrive from pharmacy  Will continue to monitor pt

## 2018-02-23 NOTE — H&P
History and Physical - [facility] Internal Medicine    Patient Information: Ernesto Jenkins 25 y o  male MRN: [id number]  Unit/Bed#: [id number] Encounter: [id number]  Admitting Physician: Elly Reno PA-C  PCP: Lonny Foster MD  Date of Admission:  02/22/18    Assessment/Plan:    Hospital Problem List:     Principal Problem:    Asthma      Plan for the Primary Problem(s):  · asthma  · Admit patient to med/surg  Given PO steroids in ED, will give IV also  Continue nebs  Consult pulmonology  No signs of pneumonia on CXR        VTE Prophylaxis: Enoxaparin (Lovenox)  / sequential compression device   Code Status: full code  POLST: There is no POLST form on file for this patient (pre-hospital)    Anticipated Length of Stay:  Patient will be admitted on an Observation basis with an anticipated length of stay of  Less than 2 midnights  Justification for Hospital Stay: patient requires IV steroids and nebs    Total Time for Visit, including Counseling / Coordination of Care: 45 minutes  Greater than 50% of this total time spent on direct patient counseling and coordination of care  Chief Complaint:   Shortness of breath    History of Present Illness:    Ernesto Jenkins is a 25 y o  male who presents with shortness of breath for 1 day  He started with a cough yesterday  He denies sick contacts  He was moving things around in his house and it may have been david  He denies any fever  He was using his inhalers without relief  He has required hospitalization for his asthma in the past    Review of Systems:    Review of Systems   Constitutional: Negative  HENT: Negative  Eyes: Negative  Respiratory: Positive for cough, shortness of breath and wheezing  Cardiovascular: Negative  Gastrointestinal: Negative  Endocrine: Negative  Genitourinary: Negative  Musculoskeletal: Negative  Skin: Negative  Allergic/Immunologic: Negative  Neurological: Negative  Hematological: Negative  Psychiatric/Behavioral: Negative  Past Medical and Surgical History:     Past Medical History:   Diagnosis Date    Asthma        History reviewed  No pertinent surgical history  Meds/Allergies:    Prior to Admission medications    Medication Sig Start Date End Date Taking? Authorizing Provider   albuterol (PROVENTIL HFA,VENTOLIN HFA) 90 mcg/act inhaler Inhale 2 puffs every 4 (four) hours 11/30/17  Yes Historical Provider, MD   albuterol (ACCUNEB) 1 25 MG/3ML nebulizer solution Inhale    Historical Provider, MD   ciprofloxacin-dexamethasone (CIPRODEX) otic suspension Administer 4 drops to the right ear 2 (two) times a day for 7 days 12/9/16 2/22/18  Suella Buerger, PA-C   ibuprofen (MOTRIN) 800 mg tablet Take 1 tablet by mouth every 6 (six) hours as needed for mild pain for up to 10 days 12/9/16 2/22/18  Suella Buerger, PA-C     I have reviewed home medications with patient personally  Allergies: No Known Allergies    Social History:     Marital Status: Single   Occupation:   Patient Pre-hospital Living Situation: lives with brother  Patient Pre-hospital Level of Mobility: ambulatory  Patient Pre-hospital Diet Restrictions: none  Substance Use History:   History   Alcohol Use    Yes     Comment: occasional      History   Smoking Status    Current Some Day Smoker   Smokeless Tobacco    Not on file     History   Drug Use No       Family History:    non-contributory    Physical Exam:     Vitals:   Blood Pressure: 127/62 (02/22/18 1816)  Pulse: (!) 117 (02/22/18 1816)  Temperature: 98 °F (36 7 °C) (02/22/18 1409)  Temp Source: Temporal (02/22/18 1409)  Respirations: 18 (02/22/18 1816)  SpO2: 93 % (02/22/18 1816)    Physical Exam   Constitutional: He is oriented to person, place, and time  He appears well-developed and well-nourished  No distress  HENT:   Head: Normocephalic and atraumatic  Eyes: Conjunctivae are normal  Pupils are equal, round, and reactive to light     Neck: Normal range of motion  Neck supple  No JVD present  No tracheal deviation present  No thyromegaly present  Cardiovascular: Regular rhythm  tachy   Pulmonary/Chest: He has wheezes  Mild respiratory distress but able to speak in short sentences  Abdominal: Soft  Bowel sounds are normal  He exhibits no distension and no mass  There is no tenderness  There is no rebound and no guarding  Musculoskeletal: Normal range of motion  He exhibits no edema, tenderness or deformity  Lymphadenopathy:     He has no cervical adenopathy  Neurological: He is alert and oriented to person, place, and time  Skin: Skin is warm and dry  No rash noted  He is not diaphoretic  No erythema  No pallor  Psychiatric: He has a normal mood and affect  His behavior is normal    Vitals reviewed  Additional Data:     Lab Results: I have personally reviewed pertinent reports  Results from last 7 days  Lab Units 02/22/18  1759   WBC Thousand/uL 13 22*   HEMOGLOBIN g/dL 18 5*   HEMATOCRIT % 51 6*   PLATELETS Thousands/uL 195   LYMPHO PCT % 4*   MONO PCT MAN % 4   EOSINO PCT MANUAL % 0       Results from last 7 days  Lab Units 02/22/18  1759   SODIUM mmol/L 141   POTASSIUM mmol/L 3 8   CHLORIDE mmol/L 103   CO2 mmol/L 24   BUN mg/dL 14   CREATININE mg/dL 0 99   CALCIUM mg/dL 9 5   TOTAL PROTEIN g/dL 8 7*   BILIRUBIN TOTAL mg/dL 1 11*   ALK PHOS U/L 107   ALT U/L 53   AST U/L 27   GLUCOSE RANDOM mg/dL 123           Imaging: I have personally reviewed pertinent reports  Xr Chest 2 Views    Result Date: 2/22/2018  Narrative: CHEST INDICATION: sob COMPARISON:  None EXAM PERFORMED/VIEWS:  XR CHEST PA & LATERAL IMAGES:  3 FINDINGS: Cardiomediastinal silhouette appears unremarkable  The lungs are clear  No pneumothorax or pleural effusion  Large retrosternal airspace with flattening of the hemidiaphragms could relate to small airways disease  Visualized osseous structures appear within normal limits for the patient's age  Impression: Large retrosternal airspace with flattening of the hemidiaphragms could relate to hyperinflated lungs related to small airways disease/asthma  Workstation performed: [id number]       EKG, Pathology, and Other Studies Reviewed on Admission:   · EKG: tachy    Allscripts / Epic Records Reviewed: Yes     ** Please Note: This note has been constructed using a voice recognition system   **

## 2018-02-23 NOTE — ED NOTES
Pt still tight with wheezing after mag  Dr Dayanara Gomez and Chelsea Hunt made aware   Respiratory called and will be down     Slick Jarrell RN  02/22/18 1901

## 2018-02-23 NOTE — CONSULTS
Consultation - Pulmonary Medicine   Miah Paredes 25 y o  male MRN: [id number]  Unit/Bed#: [place] [address]01 Encounter: [id number]      Assessment / Plan :  1  Acute Pulmonary Insufficiency   · Wean oxygen as tolerated  Patient does not wear oxygen at baseline  Continue to maintain SPO2 > 88%  · Pulmonary Toilet, ISQ1H, OOB as tolerated, PT/OT, Increase activity as able  Acapella     2  Asthma with Acute Exacerbation   · Continue Solumedrol at 40 mg IV but decrease to Q12H Today   · Continue Xopenex and Atorvent Q6H   · Start Symbicort BID   · Once tapered off the steroids he should have IGE and [place]  · He should follow up in the Pulmonary office in 4-6 weeks for Asthma follow up  Recommend PFTS as an outpatient     3  Acute Tracheobronchitis   · continue with supportive care  · Start Azithromycin PO x 5 days   · Mucinex BID  · Flonase  · Steroids, Nebulizers, and Symbicort     4  Allergic Rhinitis   · Follow up as an outpatient for IGE and [address] [place] [address] Blvd panel  · Reports he previously saw an allergy / asthma specialist while living in [place]  History of Present Illness   Physician Requesting Consult: Quentin Mcintosh MD  Reason for Consult / Principal Problem: Asthma with acute Exacerbation   Hx and PE limited by: NA   HPI: Miah Paredes is a 25y o  year old male who we are asked to see in Pulmonary Consult for Asthma with Acute Exacerbation  He Presents to the hospital on 2/22/18 with SOB and Wheezing  Patient has a PMH of asthma  He reports that about a month ago he had an Upper Respiratory Infection  He was using a nebulizer PRN after that and had not recently needed it  Then on Sunday about 5 days ago he was moving a lot of things around in a dust house  He started to have some chest tightness  He started to use his PRN Ventolin HFA with mild relief  His chest tightness continued to worsen   He reports going to work on Wednesday and started to have increased chest tightness along with cough with yellow / green sputum production  He was having wheezing and SOB  He went home and tried his PRN Inhaler without any relief  He decided to present to the hospital for further evaluation  He was provided with steroids and an hour long nebulizer in the ER  He had mild improvement  He was seen and evaluated this AM and reports feeling well  He is coughing a lot with 1-2 table spoons of yellow / green secretions  He denies any fevers or diaphoresis  He is still having chest tightness but this has improved  He is wheezing but also reports feeling improved  He has had asthma since he was a child  He was previously on singulair, Pulmicort and Albuterol Nebulizers  He recently moved to the [facility] and reports that since relocating here from [place] his allergies have been worse  He has not seen a Pulmonologist  He denies ever having PFTS  He is not currently taking anything for his Asthma on a regular basis as an outpatient  Inpatient consult to Pulmonology  Consult performed by: Ellie Cordero  Consult ordered by: Roxann Mueller          Review of Systems   Constitutional: Positive for activity change, chills, diaphoresis and fatigue  Negative for appetite change, fever and unexpected weight change  HENT: Positive for congestion, postnasal drip and rhinorrhea  Negative for nosebleeds, sinus pain, sinus pressure, sneezing, sore throat and trouble swallowing  Respiratory: Positive for cough, chest tightness, shortness of breath and wheezing  Negative for apnea, choking and stridor  Cardiovascular: Negative for chest pain, palpitations and leg swelling  Gastrointestinal: Negative for abdominal distention, abdominal pain, constipation and diarrhea  Skin: Negative for rash  Allergic/Immunologic: Negative for immunocompromised state  All other systems reviewed and are negative        Historical Information   Past Medical History:   Diagnosis Date  Asthma      History reviewed  No pertinent surgical history  Social History   History   Alcohol Use    Yes     Comment: occasional      History   Drug Use No     History   Smoking Status    Current Some Day Smoker   Smokeless Tobacco    Not on file     Occupational History:  at [employer]  Never smoker  Family History: History reviewed  No pertinent family history  Meds/Allergies   all current active meds have been reviewed    No Known Allergies    Objective   Vitals: Blood pressure 133/82, pulse 102, temperature 98 2 °F (36 8 °C), temperature source Temporal, resp  rate 20, SpO2 96 %  ,There is no height or weight on file to calculate BMI  Intake/Output Summary (Last 24 hours) at 02/23/18 3562  Last data filed at 02/22/18 2001   Gross per 24 hour   Intake             1100 ml   Output              600 ml   Net              500 ml     Invasive Devices     Peripheral Intravenous Line            Peripheral IV 02/22/18 Right Antecubital less than 1 day                Physical Exam   Constitutional: He is oriented to person, place, and time  He appears well-developed and well-nourished  No distress  HENT:   Head: Normocephalic and atraumatic  Mouth/Throat: No oropharyngeal exudate  Eyes: Conjunctivae are normal  Pupils are equal, round, and reactive to light  No scleral icterus  Neck: Normal range of motion  Neck supple  No JVD present  No tracheal deviation present  Cardiovascular: Normal rate and regular rhythm  Exam reveals no gallop and no friction rub  No murmur heard  Pulmonary/Chest: Effort normal and breath sounds normal  No stridor  No respiratory distress  He exhibits no tenderness  Inspiratory and Expiratory wheezes noted throughout all Lung Fields  Good Air Movement auscultated in all lung fields  Abdominal: Soft  Bowel sounds are normal  He exhibits no distension  There is no tenderness  Musculoskeletal: He exhibits no edema or tenderness     Lymphadenopathy: He has no cervical adenopathy  Neurological: He is alert and oriented to person, place, and time  Skin: No rash noted  He is not diaphoretic  Psychiatric: He has a normal mood and affect  Nursing note and vitals reviewed  Lab Results:   I have personally reviewed pertinent lab results  , ABG: No results found for: PHART, GVX0AOY, PO2ART, EPX7VMQ, I0QMMEDU, BEART, SOURCE, BNP: No results found for: BNP, CBC:   Lab Results   Component Value Date    WBC 13 29 (H) 02/23/2018    HGB 17 6 (H) 02/23/2018    HCT 49 5 (H) 02/23/2018    MCV 95 02/23/2018     02/23/2018    MCH 33 7 02/23/2018    MCHC 35 6 02/23/2018    RDW 13 4 02/23/2018    MPV 11 7 02/23/2018    NRBC 0 02/22/2018   , CMP:   Lab Results   Component Value Date     02/23/2018    K 4 1 02/23/2018     02/23/2018    CO2 22 02/23/2018    ANIONGAP 13 02/23/2018    BUN 16 02/23/2018    CREATININE 0 78 02/23/2018    GLUCOSE 137 02/23/2018    CALCIUM 9 1 02/23/2018    AST 27 02/22/2018    ALT 53 02/22/2018    ALKPHOS 107 02/22/2018    PROT 8 7 (H) 02/22/2018    BILITOT 1 11 (H) 02/22/2018    EGFR 128 02/23/2018   , PT/INR: No results found for: PT, INR, Troponin: No results found for: TROPONINI     Imaging Studies: I have personally reviewed pertinent reports  CXR 2/22/18:   FINDINGS:     Cardiomediastinal silhouette appears unremarkable      The lungs are clear  No pneumothorax or pleural effusion      Large retrosternal airspace with flattening of the hemidiaphragms could relate to small airways disease      Visualized osseous structures appear within normal limits for the patient's age      IMPRESSION:     Large retrosternal airspace with flattening of the hemidiaphragms could relate to hyperinflated lungs related to small airways disease/asthma  EKG, Pathology, and Other Studies: I have personally reviewed pertinent reports         PFT Results: No Formal PFTS to review     Code Status: Level 1 - Full Code

## 2018-02-24 PROCEDURE — 99232 SBSQ HOSP IP/OBS MODERATE 35: CPT | Performed by: INTERNAL MEDICINE

## 2018-02-24 PROCEDURE — 94150 VITAL CAPACITY TEST: CPT

## 2018-02-24 PROCEDURE — 94640 AIRWAY INHALATION TREATMENT: CPT

## 2018-02-24 PROCEDURE — 94760 N-INVAS EAR/PLS OXIMETRY 1: CPT

## 2018-02-24 RX ADMIN — GUAIFENESIN 600 MG: 600 TABLET, EXTENDED RELEASE ORAL at 21:14

## 2018-02-24 RX ADMIN — AZITHROMYCIN 250 MG: 250 TABLET, FILM COATED ORAL at 13:04

## 2018-02-24 RX ADMIN — ALBUTEROL SULFATE 2 PUFF: 90 AEROSOL, METERED RESPIRATORY (INHALATION) at 00:01

## 2018-02-24 RX ADMIN — LEVALBUTEROL HYDROCHLORIDE 1.25 MG: 1.25 SOLUTION, CONCENTRATE RESPIRATORY (INHALATION) at 03:25

## 2018-02-24 RX ADMIN — IPRATROPIUM BROMIDE 0.5 MG: 0.5 SOLUTION RESPIRATORY (INHALATION) at 03:25

## 2018-02-24 RX ADMIN — IPRATROPIUM BROMIDE 0.5 MG: 0.5 SOLUTION RESPIRATORY (INHALATION) at 14:21

## 2018-02-24 RX ADMIN — FLUTICASONE PROPIONATE 1 SPRAY: 50 SPRAY, METERED NASAL at 08:36

## 2018-02-24 RX ADMIN — GUAIFENESIN 600 MG: 600 TABLET, EXTENDED RELEASE ORAL at 08:35

## 2018-02-24 RX ADMIN — METHYLPREDNISOLONE SODIUM SUCCINATE 40 MG: 40 INJECTION, POWDER, FOR SOLUTION INTRAMUSCULAR; INTRAVENOUS at 21:14

## 2018-02-24 RX ADMIN — IPRATROPIUM BROMIDE 0.5 MG: 0.5 SOLUTION RESPIRATORY (INHALATION) at 20:20

## 2018-02-24 RX ADMIN — LEVALBUTEROL HYDROCHLORIDE 1.25 MG: 1.25 SOLUTION, CONCENTRATE RESPIRATORY (INHALATION) at 14:21

## 2018-02-24 RX ADMIN — LEVALBUTEROL HYDROCHLORIDE 1.25 MG: 1.25 SOLUTION, CONCENTRATE RESPIRATORY (INHALATION) at 20:20

## 2018-02-24 RX ADMIN — LEVALBUTEROL HYDROCHLORIDE 1.25 MG: 1.25 SOLUTION, CONCENTRATE RESPIRATORY (INHALATION) at 08:08

## 2018-02-24 RX ADMIN — IPRATROPIUM BROMIDE 0.5 MG: 0.5 SOLUTION RESPIRATORY (INHALATION) at 08:08

## 2018-02-24 RX ADMIN — BUDESONIDE AND FORMOTEROL FUMARATE DIHYDRATE 2 PUFF: 160; 4.5 AEROSOL RESPIRATORY (INHALATION) at 21:14

## 2018-02-24 RX ADMIN — BUDESONIDE AND FORMOTEROL FUMARATE DIHYDRATE 2 PUFF: 160; 4.5 AEROSOL RESPIRATORY (INHALATION) at 10:23

## 2018-02-24 RX ADMIN — METHYLPREDNISOLONE SODIUM SUCCINATE 40 MG: 40 INJECTION, POWDER, FOR SOLUTION INTRAMUSCULAR; INTRAVENOUS at 08:36

## 2018-02-24 NOTE — PROGRESS NOTES
[facility] Progress Note  Patient: Ngozi Narayan 25 y o  male   MRN: [id number]  PCP: Marly Yañez MD  Unit/Bed#: [place] [address] [facility] [place] [address]-01 Encounter: [id number]  Date Of Visit: 18    Assessment/plan:  1  Asthma with acute exacerbation - clinically improved  Continue Solu-Medrol 40 mg q 12 hours, nebs  2  Acute tracheobronchitis - continue azithromycin  3  Leukocytosis - due to #2 and steroids  4  Acute hypoxic respiratory failure - if hypoxia persists after asthma exacerbation resolves will need echo  O2 sats at [facility] since September in the low 90s  VTE Pharmacologic Prophylaxis:   Pharmacologic: Enoxaparin (Lovenox)  Mechanical: Mechanical VTE prophylaxis in place  Education and Discussions with Family / Patient: Kodak Bonilla family update    Time Spent for Care: 20 minutes  More than 50% of total time spent on counseling and coordination of care as described above  Current Length of Stay: 1 day(s)    Current Patient Status: Inpatient   Certification Statement: The patient will continue to require additional inpatient hospital stay due to asthma with acute exacerbation    Code Status: Level 1 - Full Code    Subjective:   Shortness of breath improved  Intermittent cough with yellow expectoration  No fever  No nausea, vomiting or diarrhea  Objective:     Vitals:   Temp (24hrs), Av 6 °F (37 °C), Min:97 9 °F (36 6 °C), Max:99 6 °F (37 6 °C)    HR:  [] 81  Resp:  [18-20] 18  BP: (124-129)/(58-63) 124/59  SpO2:  [91 %-94 %] 94 %    Physical Exam:     Physical Exam   Constitutional: He is oriented to person, place, and time  HENT:   Head: Atraumatic  Eyes: EOM are normal    Neck: Neck supple  No JVD present  No tracheal deviation present  No thyromegaly present  Cardiovascular: Normal rate, regular rhythm and normal heart sounds  Pulmonary/Chest: Effort normal    Diffuse rhonchi   Abdominal: Soft   Bowel sounds are normal  He exhibits no distension  There is no tenderness  There is no rebound  Musculoskeletal: He exhibits no edema  Neurological: He is alert and oriented to person, place, and time  Skin: Skin is warm and dry  Psychiatric: He has a normal mood and affect  Additional Data:     Labs:      Results from last 7 days  Lab Units 02/23/18  0602 02/22/18  1759   WBC Thousand/uL 13 29* 13 22*   HEMOGLOBIN g/dL 17 6* 18 5*   HEMATOCRIT % 49 5* 51 6*   PLATELETS Thousands/uL 209 195   LYMPHO PCT %  --  4*   MONO PCT MAN %  --  4   EOSINO PCT MANUAL %  --  0       Results from last 7 days  Lab Units 02/23/18  0602 02/22/18  1759   SODIUM mmol/L 139 141   POTASSIUM mmol/L 4 1 3 8   CHLORIDE mmol/L 104 103   CO2 mmol/L 22 24   BUN mg/dL 16 14   CREATININE mg/dL 0 78 0 99   CALCIUM mg/dL 9 1 9 5   TOTAL PROTEIN g/dL  --  8 7*   BILIRUBIN TOTAL mg/dL  --  1 11*   ALK PHOS U/L  --  107   ALT U/L  --  53   AST U/L  --  27   GLUCOSE RANDOM mg/dL 137 123           * I Have Reviewed All Lab Data Listed Above  * Additional Pertinent Lab Tests Reviewed:  [facility]      Last 24 Hours Medication List:     Current Facility-Administered Medications:  acetaminophen 650 mg Oral Q6H PRN Joshua Merchant PA-C   albuterol 2 puff Inhalation Q4H PRN Shaun Silva MD   azithromycin 250 mg Oral Q24H Uli Wilson PA-C   budesonide-formoterol 2 puff Inhalation BID Natacha Tripp PA-C   enoxaparin 40 mg Subcutaneous Daily Clemetine JERI Merchant   fluticasone 1 spray Each Nare Daily Uli Wilson PA-C   guaiFENesin 600 mg Oral Q12H [address] Natacha Tripp PA-C   guaiFENesin 200 mg Oral Q4H PRN GABRIELLA Rice   levalbuterol 1 25 mg Nebulization Q6H Shaun Silva MD   And       ipratropium 0 5 mg Nebulization Q6H Shaun Silva MD   methylPREDNISolone sodium succinate 40 mg Intravenous Q12H [address] Natacha Tripp PA-C   ondansetron 4 mg Intravenous Q6H PRN Joshau Merchant PA-C        Today, Patient Was Seen By: Holli Cadet MD    ** Please Note: Dragon 360 Dictation voice to text software may have been used in the creation of this document   **

## 2018-02-25 VITALS
HEART RATE: 84 BPM | RESPIRATION RATE: 18 BRPM | SYSTOLIC BLOOD PRESSURE: 118 MMHG | DIASTOLIC BLOOD PRESSURE: 67 MMHG | OXYGEN SATURATION: 92 % | TEMPERATURE: 98.7 F

## 2018-02-25 PROBLEM — J45.909 ASTHMA: Status: RESOLVED | Noted: 2018-02-22 | Resolved: 2018-02-25

## 2018-02-25 LAB
ERYTHROCYTE [DISTWIDTH] IN BLOOD BY AUTOMATED COUNT: 13.3 % (ref 11.6–15.1)
HCT VFR BLD AUTO: 49.5 % (ref 36.5–49.3)
HGB BLD-MCNC: 17 G/DL (ref 12–17)
MCH RBC QN AUTO: 32.9 PG (ref 26.8–34.3)
MCHC RBC AUTO-ENTMCNC: 34.3 G/DL (ref 31.4–37.4)
MCV RBC AUTO: 96 FL (ref 82–98)
PLATELET # BLD AUTO: 201 THOUSANDS/UL (ref 149–390)
PMV BLD AUTO: 11.8 FL (ref 8.9–12.7)
RBC # BLD AUTO: 5.16 MILLION/UL (ref 3.88–5.62)
WBC # BLD AUTO: 11.9 THOUSAND/UL (ref 4.31–10.16)

## 2018-02-25 PROCEDURE — 99239 HOSP IP/OBS DSCHRG MGMT >30: CPT | Performed by: HOSPITALIST

## 2018-02-25 PROCEDURE — 85027 COMPLETE CBC AUTOMATED: CPT | Performed by: INTERNAL MEDICINE

## 2018-02-25 PROCEDURE — 94760 N-INVAS EAR/PLS OXIMETRY 1: CPT

## 2018-02-25 PROCEDURE — 94640 AIRWAY INHALATION TREATMENT: CPT

## 2018-02-25 RX ORDER — PREDNISONE 10 MG/1
TABLET ORAL
Qty: 30 TABLET | Refills: 0 | Status: SHIPPED | OUTPATIENT
Start: 2018-02-25 | End: 2020-12-30 | Stop reason: CLARIF

## 2018-02-25 RX ORDER — CETIRIZINE HYDROCHLORIDE 10 MG/1
10 TABLET ORAL DAILY
Qty: 30 TABLET | Refills: 0 | Status: SHIPPED | OUTPATIENT
Start: 2018-02-25

## 2018-02-25 RX ORDER — AZITHROMYCIN 250 MG/1
250 TABLET, FILM COATED ORAL EVERY 24 HOURS
Qty: 3 TABLET | Refills: 0 | Status: SHIPPED | OUTPATIENT
Start: 2018-02-25 | End: 2018-02-28

## 2018-02-25 RX ORDER — GUAIFENESIN 100 MG/5ML
200 SOLUTION ORAL EVERY 4 HOURS PRN
Qty: 118 ML | Refills: 0 | Status: SHIPPED | OUTPATIENT
Start: 2018-02-25 | End: 2020-12-30

## 2018-02-25 RX ADMIN — LEVALBUTEROL HYDROCHLORIDE 1.25 MG: 1.25 SOLUTION, CONCENTRATE RESPIRATORY (INHALATION) at 01:01

## 2018-02-25 RX ADMIN — IPRATROPIUM BROMIDE 0.5 MG: 0.5 SOLUTION RESPIRATORY (INHALATION) at 01:01

## 2018-02-25 RX ADMIN — FLUTICASONE PROPIONATE 1 SPRAY: 50 SPRAY, METERED NASAL at 09:01

## 2018-02-25 RX ADMIN — BUDESONIDE AND FORMOTEROL FUMARATE DIHYDRATE 2 PUFF: 160; 4.5 AEROSOL RESPIRATORY (INHALATION) at 09:00

## 2018-02-25 RX ADMIN — IPRATROPIUM BROMIDE 0.5 MG: 0.5 SOLUTION RESPIRATORY (INHALATION) at 08:45

## 2018-02-25 RX ADMIN — LEVALBUTEROL HYDROCHLORIDE 1.25 MG: 1.25 SOLUTION, CONCENTRATE RESPIRATORY (INHALATION) at 08:45

## 2018-02-25 RX ADMIN — METHYLPREDNISOLONE SODIUM SUCCINATE 40 MG: 40 INJECTION, POWDER, FOR SOLUTION INTRAMUSCULAR; INTRAVENOUS at 09:01

## 2018-02-25 RX ADMIN — GUAIFENESIN 600 MG: 600 TABLET, EXTENDED RELEASE ORAL at 09:01

## 2018-02-25 NOTE — DISCHARGE SUMMARY
Discharge Summary - Medical Stephanie Taylor 25 y o  male MRN: [id number]    [facility] Bed[facility] [place] 2 /[facility] 2 M* Encounter: [id number]    BRIEF OVERVIEW      Admission Date: 2/22/2018       Discharge Date:  02/25/2018    Admitting Diagnosis:  Acute asthma exacerbation    Primary Discharge Diagnosis  Acute asthma exacerbation in a patient with moderate intermittent asthma  Acute tracheobronchitis  Acute hypoxic respiratory failure- resolved    Service:  [facility]        Assessment and plan on date of discharge  1   Asthma with acute exacerbation -in a patient with moderate intermittent asthma clinically improved  Change steroids to prednisone 40 mg daily with a taper of 10 mg every 3 days  Continue inhalers  Will have him follow up with the pulmonologist      2   Acute tracheobronchitis - continue azithromycin for 3 more days to complete a 5 day course  3   Leukocytosis - due to #2 and steroids  4   Acute hypoxic respiratory failure -this is currently resolved and the patient is saturating at 92% on room air  May need an echocardiogram as an outpatient if there is persistent hypoxia to rule out any intracardiac shunt  5-allergic rhinitis-follow up as an outpatient for IgE and [facility] allergy panel      Stable for discharge home    Discharge Condition: Improved    Discharge Disposition: Home/Self Care    Discharge summary:     Steve jara on [place] is a 66-year-old male with a past medical history of moderate intermittent asthma who presented to the ER of [facility] with worsening shortness of breath associated with wheezing  Patient used his Ventolin with minimal relief  In the ED he was found to be in acute hypoxic respiratory failure and with significant wheezing along with cough with mucopurulent expectoration    The patient has recently moved to the [facility] and states that since relocating here his allergies have been worse  X-ray chest did not reveal evidence of any pneumonia and his influenza PCR was negative  He was continued on updraft nebulizers and IV steroids in addition to Zithromax for his tracheobronchitis with improvement in his symptoms  Today he is feeling much better and has been changed to oral prednisone 40 mg daily which will be tapered by 10 mg every 3 days his oxygen saturation has now improved and is saturating at 92% on room air  This could be evaluated by his primary care physician and if there is persistent hypoxia with oxygen saturation between 88-90% he could be evaluated with an echocardiogram to rule out any intracardiac shunt  He currently remains stable for discharge  I have asked him to follow up with pulmonology for formal pulmonary function test as well as for long-term management          Discharge Medications   Please see Medical Reconciliation Discharge Form    Discharge Follow Up Appointments:   PCP  Pulmonology    Discharge  Statement   Total Time Spent today including physical exam, discussion with patient and family, and discharge arrangements/care 39 minutes

## 2018-02-25 NOTE — PROGRESS NOTES
Progress Note - Pollo Gant 25 y o  male MRN: [id number]    Unit/Bed#: [place] [address] -01 Encounter: [id number]      Assessment/Plan:    1  Asthma with acute exacerbation -in a patient with moderate intermittent asthma clinically improved  Change steroids to prednisone 40 mg daily with a taper of 10 mg every 3 days  Continue inhalers  Will have him follow up with the pulmonologist     2   Acute tracheobronchitis - continue azithromycin for 3 more days to complete a 5 day course  3  Leukocytosis - due to #2 and steroids  4  Acute hypoxic respiratory failure -this is currently resolved and the patient is saturating at 92% on room air  Stable for discharge home    Subjective:  Patient feels breathing is much improved  No wheezing  He states that there is a lot of dust at the place where he works at [employer] in the basement which probably has led to his asthma exacerbation  Joceline Hectorfidel He plans to take it up with the manager there  He is eager to be discharged home today  Physical Exam:   Vitals: Blood pressure 118/67, pulse 84, temperature 98 7 °F (37 1 °C), temperature source Tympanic, resp  rate 18, SpO2 92 %  ,There is no height or weight on file to calculate BMI  Gen:  Pleasant, non-tachypnic, non-dyspnic  Conversant  Heart: regular rate and rhythm, S1S2 present, no murmur, rub or gallop  Lungs: clear to ausculatation bilaterally  No wheezing, crackless, or rhonchi  No accessory muscle use or respiratory distress  Abd: soft, non-tender, non-distended  NABS, no guarding, rebound or peritoneal signs  Extremities: no clubbing, cyanosis or edema  2+pedal pulses bilaterally  Full range of motion  Neuro: awake, alert and oriented  Cranial nerves 2-12 intact  Strength and sensation grossly intact  Skin: warm and dry: no petechiae, purpura and rash      LABS:     Results from last 7 days  Lab Units 02/25/18  0510 02/23/18  0602 02/22/18  1759   WBC Thousand/uL 11 90* 13 29* 13 22* HEMOGLOBIN g/dL 17 0 17 6* 18 5*   HEMATOCRIT % 49 5* 49 5* 51 6*   PLATELETS Thousands/uL 201 209 195       Results from last 7 days  Lab Units 02/23/18  0602 02/22/18  1759   SODIUM mmol/L 139 141   POTASSIUM mmol/L 4 1 3 8   CHLORIDE mmol/L 104 103   CO2 mmol/L 22 24   BUN mg/dL 16 14   CREATININE mg/dL 0 78 0 99   GLUCOSE RANDOM mg/dL 137 123   CALCIUM mg/dL 9 1 9 5     No intake or output data in the 24 hours ending 02/25/18 0944        Current Facility-Administered Medications:  acetaminophen 650 mg Oral Q6H PRN Manuela Everett PA-C   albuterol 2 puff Inhalation Q4H PRN Arash Conrad MD   azithromycin 250 mg Oral Q24H Melissa Jolley PA-C   budesonide-formoterol 2 puff Inhalation BID Natacha Tripp PA-C   enoxaparin 40 mg Subcutaneous Daily Manuela Everett PA-C   fluticasone 1 spray Each Nare Daily Natacha Tripp PA-C   guaiFENesin 600 mg Oral Q12H [address] Natacha Tripp PA-C   guaiFENesin 200 mg Oral Q4H PRN GABRIELLA Paniagua   levalbuterol 1 25 mg Nebulization Q6H Arash Conrad MD   And       ipratropium 0 5 mg Nebulization Q6H Arash Conrad MD   methylPREDNISolone sodium succinate 40 mg Intravenous Q12H [address] Natacha Tripp PA-C   ondansetron 4 mg Intravenous Q6H PRN Manuela Everett PA-C

## 2018-02-25 NOTE — PLAN OF CARE
DISCHARGE PLANNING     Discharge to home or other facility with appropriate resources Adequate for Discharge        INFECTION - ADULT     Absence or prevention of progression during hospitalization Adequate for Discharge        Knowledge Deficit     Patient/family/caregiver demonstrates understanding of disease process, treatment plan, medications, and discharge instructions Adequate for Discharge        PAIN - ADULT     Verbalizes/displays adequate comfort level or baseline comfort level Adequate for Discharge        RESPIRATORY - ADULT     Achieves optimal ventilation and oxygenation Adequate for Discharge        SAFETY ADULT     Patient will remain free of falls Adequate for Discharge     Maintain or return to baseline ADL function Adequate for Discharge     Maintain or return mobility status to optimal level Adequate for Discharge

## 2018-02-26 NOTE — CASE MANAGEMENT
Donnia Osgood, RN Registered Nurse Addendum   Case Management Date of Service: 2/23/2018  9:46 AM         []Hide copied text  Initial Clinical Review     Admission: Date/Time/Statement:   OBS  ORDER    2/22  @   1751         IP   ORDER  ENTERED    2/23  @  1246           ED: Date/Time/Mode of Arrival:             ED Arrival Information      Expected Arrival Acuity Means of Arrival Escorted By Service Admission Type     - 2/22/2018 14:03 Urgent Ambulance [facility] Medicine Urgent     Arrival Complaint     asthma             Chief Complaint:        Chief Complaint   Patient presents with    Asthma       per ems, patient has been experiencing asthma exacerbations since lastnight  has been taking albuterol nebulizer rx at home with little relief  patient denies prior intubations           History of Illness:    Elyse Rodríguez a 25 y  o  male who presents with shortness of breath for 1 day  He started with a cough yesterday  He denies sick contacts  He was moving things around in his house and it may have been david  He denies any fever  He was using his inhalers without relief   He has required hospitalization for his asthma in the past     Review of Systems:        ED Vital Signs:            ED Triage Vitals   Temperature Pulse Respirations Blood Pressure SpO2   02/22/18 1409 02/22/18 1409 02/22/18 1409 02/22/18 1409 02/22/18 1409   98 °F (36 7 °C) (!) 136 (!) 32 151/61 97 %       Temp Source Heart Rate Source Patient Position - Orthostatic VS BP Location FiO2 (%)   02/22/18 1409 02/22/18 1409 02/22/18 1409 02/22/18 1550 --   Temporal Monitor Lying Left arm         Pain Score           02/22/18 1550           7            Wt Readings from Last 1 Encounters:   12/30/16 93 kg (205 lb)         Vital Signs (abnormal):    above     Abnormal Labs/Diagnostic Test Results:    WBC   13 22  H/H  18 5/51 6  AG  14  Total  Bili   1 11  CXR:       Large retrosternal airspace with flattening of the hemidiaphragms could relate to hyperinflated lungs related to small airways disease/asthma  Workstation performed: [id number]            Narrative:                  ED Treatment:              Medication Administration from 02/22/2018 1403 to 02/22/2018 1952        Date/Time Order Dose Route Action Action by Comments       02/22/2018 1420  EMS REPLENISHMENT MED 0  Does not apply Given to EMS Roddy Brown, JEFRY         02/22/2018 1439 albuterol CONTINUOUS nebulizing solution 10 mg Nebulization Given Troy Bardales, RT         02/22/2018 1440 ipratropium (ATROVENT) 0 02 % inhalation solution 1 mg 1 mg Nebulization Given Troy Bardales, RT         02/22/2018 1425 predniSONE tablet 60 mg 60 mg Oral Given Roddy Brown RN         02/22/2018 1555 ibuprofen (MOTRIN) tablet 600 mg 600 mg Oral Given Marisela Glynn, JEFRY         02/22/2018 1844 sodium chloride 0 9 % bolus 1,000 mL 0 mL Intravenous Stopped Fady Ann RN         02/22/2018 1802 sodium chloride 0 9 % bolus 1,000 mL 1,000 mL Intravenous New Bag Fady Ann RN         02/22/2018 1844 magnesium sulfate IVPB (premix) SOLN 1 g 0 g Intravenous Stopped Fady Ann RN         02/22/2018 1803 magnesium sulfate IVPB (premix) SOLN 1 g 1 g Intravenous New Bag Fady Ann RN         02/22/2018 1907 albuterol inhalation solution 10 mg 10 mg Nebulization Given Michael Handy Batch, RT         02/22/2018 1907 ipratropium (ATROVENT) 0 02 % inhalation solution 1 mg 1 mg Nebulization Given Michael Handy Batch, RT         02/22/2018 1907 sodium chloride 0 9 % inhalation solution 3 mL 3 mL Nebulization Given Ernie Keyes, RT               Past Medical/Surgical History:         Active Ambulatory Problems     Diagnosis Date Noted    No Active Ambulatory Problems           Resolved Ambulatory Problems     Diagnosis Date Noted    No Resolved Ambulatory Problems           Past Medical History:   Diagnosis Date    Asthma           Admitting Diagnosis: Status asthmaticus [J45 902]  Asthma [J45 909]     Age/Sex: 25 y o  male     · Assessment/Plan:    asthma  ? Admit patient to med/surg  Given PO steroids in ED, will give IV also  Continue nebs  Consult pulmonology  No signs of pneumonia on CXR           VTE Prophylaxis: Enoxaparin (Lovenox)  / sequential compression device   Code Status: full code  POLST: There is no POLST form on file for this patient (pre-hospital)     Anticipated Length of Stay: Elvin Theodore will be admitted on an Observation basis with an anticipated length of stay of  Less than 2 midnights    Justification for Hospital Stay: patient requires IV steroids and nebs     Admission Orders:    OBS  ORDER     2/22   @   1751       IP ORDER  ENTERED     2/23  @  1246  Scheduled Meds:   Current Facility-Administered Medications:  acetaminophen 650 mg Oral Q6H PRN Dawn Nichole PA-C   albuterol 2 puff Inhalation Q4H PRN Serina Medellin MD   enoxaparin 40 mg Subcutaneous Daily Dawn Nichole PA-C   guaiFENesin 200 mg Oral Q4H PRN GABRIELLA Pacheco   levalbuterol 1 25 mg Nebulization Q6H Seirna Medellin MD   And           ipratropium 0 5 mg Nebulization Q6H Serina Medellin MD   methylPREDNISolone sodium succinate 40 mg Intravenous Q6H [address] Claire Darden PA-C   ondansetron 4 mg Intravenous Q6H PRN Dawn Nichole PA-C      Continuous Infusions:    PRN Meds:   acetaminophen    albuterol    guaiFENesin    ondansetron      Reg diet  Cons pulmonary  O2  6L     Nursing progress note  2/22   11 PM  Pt continues to have to be tachypnec, with labored breathing with increased work effort  Pt oxygen sat at 91% on 6L NC  Spoke with LESLEE Darden regarding pt condition  Respiratory therapist called and came to assess pt  PRN inhaler ordered and frequency of neb treatments increased  Awaiting inhaler to arrive from pharmacy  Will continue to monitor pt      1  PER  PULMONARY CONSULT:  Asthma with acute Exacerbation   2   Acute Pulmonary Insufficiency   · Wean oxygen as tolerated  Patient does not wear oxygen at baseline  Continue to maintain SPO2 > 88%  · Pulmonary Toilet, ISQ1H, OOB as tolerated, PT/OT, Increase activity as able  Acapella      2  Asthma with Acute Exacerbation   · Continue Solumedrol at 40 mg IV Q6H for today  Likely Decrease and continue taper in the next 24-48 hours   · Continue Xopenex and Atorvent Q6H   · Start Symbicort BID   · Once tapered off the steroids he should have IGE and [address]  · He should follow up in the Pulmonary office in 4-6 weeks for Asthma follow up  Recommend PFTS as an outpatient      3  Acute Tracheobronchitis   · Likely Viral, continue with supportive care  · Mucinex BID  · Flonase  · Steroids, Nebulizers, and Symbicort      4  Allergic Rhinitis   · Follow up as an outpatient for IGE and [address] [place] [address] Blvd panel  · Reports he previously saw an allergy / asthma specialist while living in [place]       The patient will continue to require additional inpatient hospital stay due to Need for IV steroids     Respiratory: Positive for cough, chest tightness, shortness of breath and wheezing  Negative for apnea, choking and stridor  Pulmonary/Chest: Effort normal and breath sounds normal  No stridor  No respiratory distress  He exhibits no tenderness  Inspiratory and Expiratory wheezes noted throughout all Lung Fields  Good Air Movement auscultated in all lung fields     CXR:Large retrosternal airspace with flattening of the hemidiaphragms could relate to hyperinflated lungs related to small airways disease/asthma      Thank you,  [facility] in the [facility] by James Hernandez for 2017  Network Utilization Review Department  Phone: [phone]; Fax [phone]  ATTENTION: The Network Utilization Review Department is now centralized for our 7 Facilities  Make a note that we have a new phone and fax numbers for our Department   Please call with any questions or concerns to [phone] and carefully follow the prompts so that you are directed to the right person  All voicemails are confidential  Fax any determinations, approvals, denials, and requests for initial or continue stay review clinical to [phone]  Due to HIGH CALL volume, it would be easier if you could please send faxed requests to expedite your requests and in part, help us provide discharge notifications faster                      Revision History      Notification of Discharge  This is a Notification of Discharge from our facility [address]  Please be advised that this patient has been discharge from our facility  Below you will find the admission and discharge date and time including the patients disposition  PRESENTATION DATE: 2/22/2018  2:11 PM  IP ADMISSION DATE: 2/23/18 1247  DISCHARGE DATE: 2/25/2018 12:27 PM  DISPOSITION: [address] [place] [address] in the [facility] by James Hernandez for 2017  Network Utilization Review Department  Phone: [phone]; Fax [phone]  ATTENTION: The Network Utilization Review Department is now centralized for our 7 Facilities  Make a note that we have a new phone and fax numbers for our Department  Please call with any questions or concerns to [phone] and carefully follow the prompts so that you are directed to the right person  All voicemails are confidential  Fax any determinations, approvals, denials, and requests for initial or continue stay review clinical to [phone]  Due to HIGH CALL volume, it would be easier if you could please send faxed requests to expedite your requests and in part, help us provide discharge notifications faster

## 2019-09-22 ENCOUNTER — OFFICE VISIT (OUTPATIENT)
Dept: URGENT CARE | Age: 24
End: 2019-09-22
Payer: COMMERCIAL

## 2019-09-22 VITALS
OXYGEN SATURATION: 95 % | TEMPERATURE: 99 F | DIASTOLIC BLOOD PRESSURE: 64 MMHG | SYSTOLIC BLOOD PRESSURE: 136 MMHG | RESPIRATION RATE: 18 BRPM | HEART RATE: 93 BPM

## 2019-09-22 DIAGNOSIS — B35.4 TINEA CORPORIS: Primary | ICD-10-CM

## 2019-09-22 PROCEDURE — G0382 LEV 3 HOSP TYPE B ED VISIT: HCPCS | Performed by: PHYSICIAN ASSISTANT

## 2019-09-22 PROCEDURE — 99203 OFFICE O/P NEW LOW 30 MIN: CPT | Performed by: PHYSICIAN ASSISTANT

## 2019-09-22 PROCEDURE — 99283 EMERGENCY DEPT VISIT LOW MDM: CPT | Performed by: PHYSICIAN ASSISTANT

## 2019-09-22 RX ORDER — CLOTRIMAZOLE 1 %
CREAM (GRAM) TOPICAL 2 TIMES DAILY
Qty: 30 G | Refills: 0 | Status: SHIPPED | OUTPATIENT
Start: 2019-09-22 | End: 2020-12-30

## 2019-09-22 NOTE — LETTER
September 22, 2019     Patient: Smith Horne   YOB: 1995   Date of Visit: 9/22/2019       To Whom it May Concern:    Smith Horne was seen in my clinic on 9/22/2019  He may return to work on 09/23/2019  If you have any questions or concerns, please don't hesitate to call           Sincerely,          YELENA WRIGHT        CC: No Recipients

## 2019-09-22 NOTE — PATIENT INSTRUCTIONS
Use cream as directed to affected area  Key rash covered while at work  Follow up with PCP in 3-5 days  Proceed to  ER if symptoms worsen  Tinea Corporis   AMBULATORY CARE:   Tinea corporis , or ringworm, is a skin infection caused by a fungus  It usually affects the skin on your face, chest, or limbs  Tinea corporis is most common in children and athletes  Common signs and symptoms include the following:  Tinea corporis may begin as 1 or more flat, red patches  As the infection grows, it spreads out in a [place] or ring, leaving normal-looking skin in the middle  At the edge of the ring, the skin is red and raised  It may be either dry and scaly, or moist and crusty  The infected skin may itch  Although the infection looks like you have a worm under your skin, there is no worm  Contact your healthcare provider if:   · You have a fever  · Your rash continues to spread after 7 days of treatment  · Your rash is not gone in 2 weeks  · The area around your rash becomes red, warm, tender, swollen, or smells bad  · You have questions or concerns about your condition or care  Treatment:  Tinea corporis is usually treated with antifungal medicine  It may be given as a cream or pill  Take the medicine until it is gone, even if it looks like your infection is gone sooner  Prevent the spread of tinea corporis:   · Wash all items that come into contact with infected skin  Wash all towels, clothes, and bedding in hot water  Use laundry soap  Clean shower stalls, mats, and floors with a germ-killing or fungus-killing   · Do not share personal items  Do not share towels, brushes, shrestha, or hair accessories  · Keep your skin, hair, and nails clean and dry  Bathe every day, and dry your skin before you put medicine on the infected area  Wash your hands often  Do not scratch your sores  This may cause the infection to spread      · Do not participate in contact sports , such as wrestling, for 72 hours after starting treatment  Talk to your healthcare provider before you participate in contact sports  · Have infected pets treated by a   A patch of missing fur is a sign of infection in a pet  Wear gloves and long sleeves if you handle an infected animal  Always wash your hands after handling the animal  Vacuum your home to remove infected fur or skin flakes  Disinfect surfaces and bedding that your animal comes into contact with  Follow up with your healthcare provider as directed:  Write down your questions so you remember to ask them during your visits  © 2017 [facility] [employer]  Information is for End User's use only and may not be sold, redistributed or otherwise used for commercial purposes  All illustrations and images included in CareNotes® are the copyrighted property of A D A M , Inc  or James Hernandez  The above information is an  only  It is not intended as medical advice for individual conditions or treatments  Talk to your doctor, nurse or pharmacist before following any medical regimen to see if it is safe and effective for you

## 2019-09-22 NOTE — PROGRESS NOTES
[employer]        NAME: Viraj Robert is a 25 y o  male  : 1995    MRN: [id number]  DATE: 2019  TIME: 10:03 AM    Assessment and Plan   Tinea corporis [B35 4]  1  Tinea corporis  clotrimazole (LOTRIMIN) 1 % cream         Patient Instructions     Use cream as directed to affected area  Key rash covered while at work  Follow up with PCP in 3-5 days  Proceed to  ER if symptoms worsen  Chief Complaint     Chief Complaint   Patient presents with    Rash     on left wrist for 3 days         History of Present Illness       51-year-old male presents with circular rash on left wrist   Concerned that might be ringworm  Has applied some coconut oil to the area without any improvement  No other complaints    Rash   This is a new problem  The current episode started 1 to 4 weeks ago  The problem has been gradually worsening since onset  Location: Left wrist  The rash is characterized by itchiness and redness  He was exposed to nothing  Pertinent negatives include no fever  Treatments tried: Coconut oil  The treatment provided no relief  Review of Systems   Review of Systems   Constitutional: Negative  Negative for fever  Respiratory: Negative  Cardiovascular: Negative  Skin: Positive for rash  Neurological: Negative            Current Medications       Current Outpatient Medications:     albuterol (ACCUNEB) 1 25 MG/3ML nebulizer solution, Inhale, Disp: , Rfl:     albuterol (PROVENTIL HFA,VENTOLIN HFA) 90 mcg/act inhaler, Inhale 2 puffs every 4 (four) hours, Disp: , Rfl:     cetirizine (ZyrTEC) 10 mg tablet, Take 1 tablet (10 mg total) by mouth daily, Disp: 30 tablet, Rfl: 0    clotrimazole (LOTRIMIN) 1 % cream, Apply topically 2 (two) times a day, Disp: 30 g, Rfl: 0    guaiFENesin (ROBITUSSIN) 100 mg/5 mL oral solution, Take 10 mL (200 mg total) by mouth every 4 (four) hours as needed (Cough), Disp: 118 mL, Rfl: 0    predniSONE 10 mg tablet, 40 mg daily for 3 days followed by 30 mg daily for 3 days followed by 20 mg daily for 3 days then 10 mg daily for 3 days then stop, Disp: 30 tablet, Rfl: 0    Current Allergies     Allergies as of 09/22/2019    (No Known Allergies)            The following portions of the patient's history were reviewed and updated as appropriate: allergies, current medications, past family history, past medical history, past social history, past surgical history and problem list      Past Medical History:   Diagnosis Date    Asthma        History reviewed  No pertinent surgical history  Family History   Problem Relation Age of Onset    Diabetes type II Maternal Grandmother         Mellitus    Diabetes type II Paternal Uncle         Mellitus         Medications have been verified  Objective   /64   Pulse 93   Temp 99 °F (37 2 °C)   Resp 18   SpO2 95%        Physical Exam     Physical Exam   Constitutional: He is oriented to person, place, and time  He appears well-developed and well-nourished  No distress  HENT:   Head: Normocephalic and atraumatic  Right Ear: External ear normal    Left Ear: External ear normal    Nose: Nose normal    Mouth/Throat: Oropharynx is clear and moist    Eyes: Conjunctivae are normal  Right eye exhibits no discharge  Left eye exhibits no discharge  Neck: Normal range of motion  Neck supple  Cardiovascular: Normal rate, regular rhythm and intact distal pulses  Pulmonary/Chest: Effort normal  No respiratory distress  Musculoskeletal: Normal range of motion  Neurological: He is alert and oriented to person, place, and time  Skin: Skin is warm and dry  Rash (  Circular red rash noted to left wrist   Well demarcated  No drainage noted  No swelling noted ) noted  Psychiatric: He has a normal mood and affect  Nursing note and vitals reviewed

## 2020-07-01 ENCOUNTER — NURSE TRIAGE (OUTPATIENT)
Dept: OTHER | Facility: OTHER | Age: 25
End: 2020-07-01

## 2020-07-01 DIAGNOSIS — Z20.828 SARS-ASSOCIATED CORONAVIRUS EXPOSURE: Primary | ICD-10-CM

## 2020-07-01 DIAGNOSIS — Z20.828 SARS-ASSOCIATED CORONAVIRUS EXPOSURE: ICD-10-CM

## 2020-07-01 PROCEDURE — U0003 INFECTIOUS AGENT DETECTION BY NUCLEIC ACID (DNA OR RNA); SEVERE ACUTE RESPIRATORY SYNDROME CORONAVIRUS 2 (SARS-COV-2) (CORONAVIRUS DISEASE [COVID-19]), AMPLIFIED PROBE TECHNIQUE, MAKING USE OF HIGH THROUGHPUT TECHNOLOGIES AS DESCRIBED BY CMS-2020-01-R: HCPCS

## 2020-07-01 NOTE — TELEPHONE ENCOUNTER
Regarding: Covid concerns  ----- Message from Talha White sent at 7/1/2020  1:24 PM EDT -----  " Patient have no symptoms, but need to get tested for work "

## 2020-07-01 NOTE — TELEPHONE ENCOUNTER
Reason for Disposition   [1] Living in area with community spread (identified by local PHD) BUT [2] NO symptoms    Protocols used: CORONAVIRUS (COVID-19) EXPOSURE-ADULT-OH

## 2020-07-07 ENCOUNTER — TELEPHONE (OUTPATIENT)
Dept: URGENT CARE | Facility: MEDICAL CENTER | Age: 25
End: 2020-07-07

## 2020-07-07 LAB — SARS-COV-2 RNA SPEC QL NAA+PROBE: NOT DETECTED

## 2020-12-30 ENCOUNTER — HOSPITAL ENCOUNTER (OUTPATIENT)
Facility: HOSPITAL | Age: 25
Setting detail: OBSERVATION
Discharge: HOME | End: 2020-12-31
Attending: EMERGENCY MEDICINE | Admitting: INTERNAL MEDICINE

## 2020-12-30 ENCOUNTER — APPOINTMENT (EMERGENCY)
Dept: RADIOLOGY | Facility: HOSPITAL | Age: 25
End: 2020-12-30

## 2020-12-30 DIAGNOSIS — J45.901 ASTHMA EXACERBATION: Primary | ICD-10-CM

## 2020-12-30 PROBLEM — J96.01 ACUTE RESPIRATORY FAILURE WITH HYPOXIA (HCC): Status: ACTIVE | Noted: 2020-12-30

## 2020-12-30 PROBLEM — R00.0 SINUS TACHYCARDIA: Status: ACTIVE | Noted: 2020-12-30

## 2020-12-30 PROBLEM — E87.2 LACTIC ACIDOSIS: Status: ACTIVE | Noted: 2020-12-30

## 2020-12-30 PROBLEM — R79.89 ELEVATED LACTIC ACID LEVEL: Status: ACTIVE | Noted: 2020-12-30

## 2020-12-30 PROBLEM — E87.20 LACTIC ACIDOSIS: Status: ACTIVE | Noted: 2020-12-30

## 2020-12-30 LAB
ALBUMIN SERPL BCP-MCNC: 4.5 G/DL (ref 3.5–5)
ALP SERPL-CCNC: 111 U/L (ref 46–116)
ALT SERPL W P-5'-P-CCNC: 30 U/L (ref 12–78)
ANION GAP SERPL CALCULATED.3IONS-SCNC: 10 MMOL/L (ref 4–13)
APTT PPP: 30 SECONDS (ref 23–37)
AST SERPL W P-5'-P-CCNC: 22 U/L (ref 5–45)
ATRIAL RATE: 133 BPM
BASOPHILS # BLD AUTO: 0.08 THOUSANDS/ΜL (ref 0–0.1)
BASOPHILS NFR BLD AUTO: 1 % (ref 0–1)
BILIRUB SERPL-MCNC: 0.73 MG/DL (ref 0.2–1)
BUN SERPL-MCNC: 13 MG/DL (ref 5–25)
CALCIUM SERPL-MCNC: 9.1 MG/DL (ref 8.3–10.1)
CHLORIDE SERPL-SCNC: 107 MMOL/L (ref 100–108)
CO2 SERPL-SCNC: 25 MMOL/L (ref 21–32)
CREAT SERPL-MCNC: 1.04 MG/DL (ref 0.6–1.3)
EOSINOPHIL # BLD AUTO: 0.8 THOUSAND/ΜL (ref 0–0.61)
EOSINOPHIL NFR BLD AUTO: 6 % (ref 0–6)
ERYTHROCYTE [DISTWIDTH] IN BLOOD BY AUTOMATED COUNT: 12.8 % (ref 11.6–15.1)
FLUAV RNA RESP QL NAA+PROBE: NEGATIVE
FLUBV RNA RESP QL NAA+PROBE: NEGATIVE
GFR SERPL CREATININE-BSD FRML MDRD: 99 ML/MIN/1.73SQ M
GLUCOSE SERPL-MCNC: 117 MG/DL (ref 65–140)
HCT VFR BLD AUTO: 52.7 % (ref 36.5–49.3)
HGB BLD-MCNC: 18 G/DL (ref 12–17)
IMM GRANULOCYTES # BLD AUTO: 0.1 THOUSAND/UL (ref 0–0.2)
IMM GRANULOCYTES NFR BLD AUTO: 1 % (ref 0–2)
INR PPP: 1.08 (ref 0.84–1.19)
LACTATE SERPL-SCNC: 2.3 MMOL/L (ref 0.5–2)
LACTATE SERPL-SCNC: 2.5 MMOL/L (ref 0.5–2)
LYMPHOCYTES # BLD AUTO: 1.97 THOUSANDS/ΜL (ref 0.6–4.47)
LYMPHOCYTES NFR BLD AUTO: 15 % (ref 14–44)
MAGNESIUM SERPL-MCNC: 2.2 MG/DL (ref 1.6–2.6)
MCH RBC QN AUTO: 32.4 PG (ref 26.8–34.3)
MCHC RBC AUTO-ENTMCNC: 34.2 G/DL (ref 31.4–37.4)
MCV RBC AUTO: 95 FL (ref 82–98)
MONOCYTES # BLD AUTO: 1.05 THOUSAND/ΜL (ref 0.17–1.22)
MONOCYTES NFR BLD AUTO: 8 % (ref 4–12)
NEUTROPHILS # BLD AUTO: 8.77 THOUSANDS/ΜL (ref 1.85–7.62)
NEUTS SEG NFR BLD AUTO: 69 % (ref 43–75)
NRBC BLD AUTO-RTO: 0 /100 WBCS
P AXIS: 75 DEGREES
PLATELET # BLD AUTO: 211 THOUSANDS/UL (ref 149–390)
PMV BLD AUTO: 11.1 FL (ref 8.9–12.7)
POTASSIUM SERPL-SCNC: 3.6 MMOL/L (ref 3.5–5.3)
PR INTERVAL: 142 MS
PROT SERPL-MCNC: 8.6 G/DL (ref 6.4–8.2)
PROTHROMBIN TIME: 13.8 SECONDS (ref 11.6–14.5)
QRS AXIS: 100 DEGREES
QRSD INTERVAL: 70 MS
QT INTERVAL: 286 MS
QTC INTERVAL: 425 MS
RBC # BLD AUTO: 5.55 MILLION/UL (ref 3.88–5.62)
RSV RNA RESP QL NAA+PROBE: NEGATIVE
SARS-COV-2 RNA RESP QL NAA+PROBE: NEGATIVE
SODIUM SERPL-SCNC: 142 MMOL/L (ref 136–145)
T WAVE AXIS: 62 DEGREES
VENTRICULAR RATE: 133 BPM
WBC # BLD AUTO: 12.77 THOUSAND/UL (ref 4.31–10.16)

## 2020-12-30 PROCEDURE — 94640 AIRWAY INHALATION TREATMENT: CPT

## 2020-12-30 PROCEDURE — 87040 BLOOD CULTURE FOR BACTERIA: CPT | Performed by: EMERGENCY MEDICINE

## 2020-12-30 PROCEDURE — 85025 COMPLETE CBC W/AUTO DIFF WBC: CPT | Performed by: EMERGENCY MEDICINE

## 2020-12-30 PROCEDURE — 85730 THROMBOPLASTIN TIME PARTIAL: CPT | Performed by: EMERGENCY MEDICINE

## 2020-12-30 PROCEDURE — 93005 ELECTROCARDIOGRAM TRACING: CPT

## 2020-12-30 PROCEDURE — 84443 ASSAY THYROID STIM HORMONE: CPT | Performed by: INTERNAL MEDICINE

## 2020-12-30 PROCEDURE — 93010 ELECTROCARDIOGRAM REPORT: CPT | Performed by: INTERNAL MEDICINE

## 2020-12-30 PROCEDURE — 99291 CRITICAL CARE FIRST HOUR: CPT | Performed by: EMERGENCY MEDICINE

## 2020-12-30 PROCEDURE — 83735 ASSAY OF MAGNESIUM: CPT | Performed by: EMERGENCY MEDICINE

## 2020-12-30 PROCEDURE — 36415 COLL VENOUS BLD VENIPUNCTURE: CPT | Performed by: EMERGENCY MEDICINE

## 2020-12-30 PROCEDURE — 94760 N-INVAS EAR/PLS OXIMETRY 1: CPT

## 2020-12-30 PROCEDURE — 96365 THER/PROPH/DIAG IV INF INIT: CPT

## 2020-12-30 PROCEDURE — 85610 PROTHROMBIN TIME: CPT | Performed by: EMERGENCY MEDICINE

## 2020-12-30 PROCEDURE — 99291 CRITICAL CARE FIRST HOUR: CPT

## 2020-12-30 PROCEDURE — 80053 COMPREHEN METABOLIC PANEL: CPT | Performed by: EMERGENCY MEDICINE

## 2020-12-30 PROCEDURE — 99220 PR INITIAL OBSERVATION CARE/DAY 70 MINUTES: CPT | Performed by: INTERNAL MEDICINE

## 2020-12-30 PROCEDURE — 0241U HB NFCT DS VIR RESP RNA 4 TRGT: CPT | Performed by: EMERGENCY MEDICINE

## 2020-12-30 PROCEDURE — 71045 X-RAY EXAM CHEST 1 VIEW: CPT

## 2020-12-30 PROCEDURE — 83605 ASSAY OF LACTIC ACID: CPT | Performed by: EMERGENCY MEDICINE

## 2020-12-30 PROCEDURE — 96366 THER/PROPH/DIAG IV INF ADDON: CPT

## 2020-12-30 RX ORDER — ONDANSETRON 2 MG/ML
4 INJECTION INTRAMUSCULAR; INTRAVENOUS EVERY 4 HOURS PRN
Status: DISCONTINUED | OUTPATIENT
Start: 2020-12-30 | End: 2020-12-31 | Stop reason: HOSPADM

## 2020-12-30 RX ORDER — ACETAMINOPHEN 325 MG/1
650 TABLET ORAL EVERY 6 HOURS PRN
Status: DISCONTINUED | OUTPATIENT
Start: 2020-12-30 | End: 2020-12-31 | Stop reason: HOSPADM

## 2020-12-30 RX ORDER — SODIUM CHLORIDE FOR INHALATION 0.9 %
3 VIAL, NEBULIZER (ML) INHALATION
Status: DISCONTINUED | OUTPATIENT
Start: 2020-12-30 | End: 2020-12-30

## 2020-12-30 RX ORDER — OXYCODONE HYDROCHLORIDE 5 MG/1
5 TABLET ORAL EVERY 4 HOURS PRN
Status: DISCONTINUED | OUTPATIENT
Start: 2020-12-30 | End: 2020-12-31 | Stop reason: HOSPADM

## 2020-12-30 RX ORDER — LORAZEPAM 2 MG/ML
0.5 INJECTION INTRAMUSCULAR 4 TIMES DAILY PRN
Status: DISCONTINUED | OUTPATIENT
Start: 2020-12-30 | End: 2020-12-31 | Stop reason: HOSPADM

## 2020-12-30 RX ORDER — SODIUM CHLORIDE FOR INHALATION 0.9 %
12 VIAL, NEBULIZER (ML) INHALATION ONCE
Status: COMPLETED | OUTPATIENT
Start: 2020-12-30 | End: 2020-12-30

## 2020-12-30 RX ORDER — ALBUTEROL SULFATE 90 UG/1
2 AEROSOL, METERED RESPIRATORY (INHALATION) EVERY 4 HOURS PRN
Status: DISCONTINUED | OUTPATIENT
Start: 2020-12-30 | End: 2020-12-31 | Stop reason: HOSPADM

## 2020-12-30 RX ORDER — METHYLPREDNISOLONE SODIUM SUCCINATE 40 MG/ML
40 INJECTION, POWDER, LYOPHILIZED, FOR SOLUTION INTRAMUSCULAR; INTRAVENOUS EVERY 8 HOURS SCHEDULED
Status: DISCONTINUED | OUTPATIENT
Start: 2020-12-30 | End: 2020-12-31 | Stop reason: HOSPADM

## 2020-12-30 RX ORDER — METHYLPREDNISOLONE SODIUM SUCCINATE 125 MG/2ML
125 INJECTION, POWDER, LYOPHILIZED, FOR SOLUTION INTRAMUSCULAR; INTRAVENOUS ONCE
Status: DISCONTINUED | OUTPATIENT
Start: 2020-12-30 | End: 2020-12-30

## 2020-12-30 RX ORDER — LEVALBUTEROL 1.25 MG/.5ML
1.25 SOLUTION, CONCENTRATE RESPIRATORY (INHALATION)
Status: DISCONTINUED | OUTPATIENT
Start: 2020-12-30 | End: 2020-12-31 | Stop reason: HOSPADM

## 2020-12-30 RX ORDER — LEVOFLOXACIN 5 MG/ML
500 INJECTION, SOLUTION INTRAVENOUS EVERY 24 HOURS
Status: DISCONTINUED | OUTPATIENT
Start: 2020-12-30 | End: 2020-12-31 | Stop reason: HOSPADM

## 2020-12-30 RX ORDER — MAGNESIUM SULFATE HEPTAHYDRATE 40 MG/ML
2 INJECTION, SOLUTION INTRAVENOUS ONCE
Status: COMPLETED | OUTPATIENT
Start: 2020-12-30 | End: 2020-12-30

## 2020-12-30 RX ORDER — LEVALBUTEROL 1.25 MG/.5ML
1.25 SOLUTION, CONCENTRATE RESPIRATORY (INHALATION)
Status: DISCONTINUED | OUTPATIENT
Start: 2020-12-30 | End: 2020-12-30

## 2020-12-30 RX ORDER — GUAIFENESIN 600 MG
600 TABLET, EXTENDED RELEASE 12 HR ORAL EVERY 12 HOURS SCHEDULED
COMMUNITY

## 2020-12-30 RX ORDER — GUAIFENESIN 600 MG
600 TABLET, EXTENDED RELEASE 12 HR ORAL EVERY 12 HOURS SCHEDULED
Status: DISCONTINUED | OUTPATIENT
Start: 2020-12-30 | End: 2020-12-31

## 2020-12-30 RX ORDER — TERBUTALINE SULFATE 1 MG/ML
1 INJECTION, SOLUTION SUBCUTANEOUS ONCE
Status: COMPLETED | OUTPATIENT
Start: 2020-12-30 | End: 2020-12-30

## 2020-12-30 RX ORDER — ALBUTEROL SULFATE 2.5 MG/3ML
5 SOLUTION RESPIRATORY (INHALATION) ONCE
Status: COMPLETED | OUTPATIENT
Start: 2020-12-30 | End: 2020-12-30

## 2020-12-30 RX ORDER — LORATADINE 10 MG/1
10 TABLET ORAL DAILY
Status: DISCONTINUED | OUTPATIENT
Start: 2020-12-31 | End: 2020-12-31 | Stop reason: HOSPADM

## 2020-12-30 RX ORDER — SODIUM CHLORIDE 9 MG/ML
100 INJECTION, SOLUTION INTRAVENOUS CONTINUOUS
Status: DISPENSED | OUTPATIENT
Start: 2020-12-30 | End: 2020-12-31

## 2020-12-30 RX ORDER — METHYLPREDNISOLONE SOD SUCC 125 MG
1 VIAL (EA) INJECTION ONCE
Status: COMPLETED | OUTPATIENT
Start: 2020-12-30 | End: 2020-12-30

## 2020-12-30 RX ORDER — LORAZEPAM 2 MG/ML
1 INJECTION INTRAMUSCULAR ONCE
Status: DISCONTINUED | OUTPATIENT
Start: 2020-12-30 | End: 2020-12-30

## 2020-12-30 RX ADMIN — SODIUM CHLORIDE 1000 ML: 0.9 INJECTION, SOLUTION INTRAVENOUS at 17:33

## 2020-12-30 RX ADMIN — LORAZEPAM 0.5 MG: 2 INJECTION INTRAMUSCULAR; INTRAVENOUS at 20:37

## 2020-12-30 RX ADMIN — LEVOFLOXACIN 500 MG: 5 INJECTION, SOLUTION INTRAVENOUS at 19:58

## 2020-12-30 RX ADMIN — IPRATROPIUM BROMIDE 0.5 MG: 0.5 SOLUTION RESPIRATORY (INHALATION) at 17:33

## 2020-12-30 RX ADMIN — SODIUM CHLORIDE 100 ML/HR: 0.9 INJECTION, SOLUTION INTRAVENOUS at 20:41

## 2020-12-30 RX ADMIN — GUAIFENESIN 600 MG: 600 TABLET ORAL at 20:37

## 2020-12-30 RX ADMIN — METHYLPREDNISOLONE SODIUM SUCCINATE 40 MG: 40 INJECTION, POWDER, FOR SOLUTION INTRAMUSCULAR; INTRAVENOUS at 21:18

## 2020-12-30 RX ADMIN — LEVALBUTEROL HYDROCHLORIDE 1.25 MG: 1.25 SOLUTION, CONCENTRATE RESPIRATORY (INHALATION) at 21:33

## 2020-12-30 RX ADMIN — OXYCODONE HYDROCHLORIDE 5 MG: 5 TABLET ORAL at 21:22

## 2020-12-30 RX ADMIN — ALBUTEROL SULFATE 2 PUFF: 90 AEROSOL, METERED RESPIRATORY (INHALATION) at 22:39

## 2020-12-30 RX ADMIN — ISODIUM CHLORIDE 12 ML: 0.03 SOLUTION RESPIRATORY (INHALATION) at 13:53

## 2020-12-30 RX ADMIN — MAGNESIUM SULFATE HEPTAHYDRATE 2 G: 40 INJECTION, SOLUTION INTRAVENOUS at 13:45

## 2020-12-30 RX ADMIN — ALBUTEROL SULFATE 5 MG: 2.5 SOLUTION RESPIRATORY (INHALATION) at 17:33

## 2020-12-30 RX ADMIN — SODIUM CHLORIDE 500 ML: 0.9 INJECTION, SOLUTION INTRAVENOUS at 13:45

## 2020-12-30 RX ADMIN — IPRATROPIUM BROMIDE 0.5 MG: 0.5 SOLUTION RESPIRATORY (INHALATION) at 21:33

## 2020-12-30 RX ADMIN — ALBUTEROL SULFATE 10 MG: 2.5 SOLUTION RESPIRATORY (INHALATION) at 13:53

## 2020-12-30 RX ADMIN — IPRATROPIUM BROMIDE 1 MG: 0.5 SOLUTION RESPIRATORY (INHALATION) at 13:53

## 2020-12-30 NOTE — ED NOTES
Pt not tolerating bipap  Placed back on non-rebreather  Breathing tx started at this time        Brina Caldwell RN  12/30/20 2358

## 2020-12-30 NOTE — ED PROVIDER NOTES
History  Chief Complaint   Patient presents with    Asthma     Pt arrives via EMS - reports asthma exacerbation  Using inhaler without relief  terbutaline and solu medrol given pre hospital         History provided by:  Patient   used: No    Asthma  Quality:  Wheezing  Severity:  Moderate  Onset quality:  Gradual  Duration:  2 days  Timing:  Intermittent  Progression:  Waxing and waning  Chronicity:  Recurrent  Context:  Hx of Asthma  Relieved by:  Nothing  Worsened by:  None  Ineffective treatments:  None  Associated symptoms: cough    Associated symptoms: no abdominal pain, no chest pain, no diarrhea, no fever, no headaches, no loss of consciousness, no nausea, no rash, no shortness of breath, no sore throat and no vomiting    Cough:     Cough characteristics:  Non-productive    Sputum characteristics:  Nondescript    Severity:  Mild    Onset quality:  Gradual    Duration:  2 days    Timing:  Intermittent    Progression:  Waxing and waning    Chronicity:  New  Risk factors:  Asthma      Prior to Admission Medications   Prescriptions Last Dose Informant Patient Reported? Taking? albuterol (ACCUNEB) 1 25 MG/3ML nebulizer solution   Yes Yes   Sig: Inhale   albuterol (PROVENTIL HFA,VENTOLIN HFA) 90 mcg/act inhaler   Yes Yes   Sig: Inhale 2 puffs every 4 (four) hours   cetirizine (ZyrTEC) 10 mg tablet   No Yes   Sig: Take 1 tablet (10 mg total) by mouth daily   guaiFENesin (MUCINEX) 600 mg 12 hr tablet   Yes Yes   Sig: Take 1,200 mg by mouth every 12 (twelve) hours   predniSONE 10 mg tablet Not Taking at Unknown time  No No   Si mg daily for 3 days followed by 30 mg daily for 3 days followed by 20 mg daily for 3 days then 10 mg daily for 3 days then stop   Patient not taking: Reported on 2020      Facility-Administered Medications: None       Past Medical History:   Diagnosis Date    Asthma        History reviewed  No pertinent surgical history      Family History   Problem Relation Age of Onset    Diabetes type II Maternal Grandmother         Mellitus    Diabetes type II Paternal Uncle         Mellitus     I have reviewed and agree with the history as documented  E-Cigarette/Vaping     E-Cigarette/Vaping Substances     Social History     Tobacco Use    Smoking status: Current Some Day Smoker    Smokeless tobacco: Never Used    Tobacco comment: No passive smoke exposure   Substance Use Topics    Alcohol use: Yes     Comment: occasional     Drug use: No       Review of Systems   Constitutional: Negative for chills and fever  HENT: Negative for facial swelling, sore throat and trouble swallowing  Eyes: Negative for pain and visual disturbance  Respiratory: Positive for cough  Negative for shortness of breath  Cardiovascular: Negative for chest pain and leg swelling  Gastrointestinal: Negative for abdominal pain, diarrhea, nausea and vomiting  Genitourinary: Negative for dysuria and flank pain  Musculoskeletal: Negative for back pain, neck pain and neck stiffness  Skin: Negative for pallor and rash  Allergic/Immunologic: Negative for environmental allergies and immunocompromised state  Neurological: Negative for dizziness, loss of consciousness and headaches  Hematological: Negative for adenopathy  Does not bruise/bleed easily  Psychiatric/Behavioral: Negative for agitation and behavioral problems  All other systems reviewed and are negative  Physical Exam  Physical Exam  Vitals signs and nursing note reviewed  Constitutional:       General: He is not in acute distress  Appearance: He is well-developed  HENT:      Head: Normocephalic and atraumatic  Neck:      Musculoskeletal: Normal range of motion and neck supple  Cardiovascular:      Rate and Rhythm: Tachycardia present  Pulmonary:      Effort: Pulmonary effort is normal       Breath sounds: Wheezing present     Abdominal:      General: Bowel sounds are normal       Palpations: Abdomen is soft       Tenderness: There is no abdominal tenderness  There is no guarding or rebound  Musculoskeletal: Normal range of motion  Skin:     General: Skin is warm and dry  Neurological:      Mental Status: He is alert and oriented to person, place, and time           Vital Signs  ED Triage Vitals   Temperature Pulse Respirations Blood Pressure SpO2   12/30/20 1348 12/30/20 1336 12/30/20 1336 12/30/20 1336 12/30/20 1336   98 6 °F (37 °C) (!) 132 (!) 36 (!) 181/111 95 %      Temp Source Heart Rate Source Patient Position - Orthostatic VS BP Location FiO2 (%)   12/30/20 1348 12/30/20 1415 12/30/20 1559 12/30/20 1559 --   Temporal Monitor Lying Right arm       Pain Score       12/30/20 1336       6           Vitals:    12/30/20 1336 12/30/20 1415 12/30/20 1559 12/30/20 1730   BP: (!) 181/111  136/66    Pulse: (!) 132 (!) 120 (!) 115 (!) 107   Patient Position - Orthostatic VS:   Lying          Visual Acuity      ED Medications  Medications   sodium chloride 0 9 % bolus 1,000 mL (1,000 mL Intravenous New Bag 12/30/20 1733)   terbutaline (FOR EMS ONLY) (BRETHINE) injection 1 each (0 each Does not apply Given to EMS 12/30/20 1336)   methylPREDNISolone sodium succinate (FOR EMS ONLY) (Solu-MEDROL) 125 MG injection 125 mg (0 mg Does not apply Given to EMS 12/30/20 1336)   albuterol inhalation solution 10 mg (10 mg Nebulization Given 12/30/20 1353)   ipratropium (ATROVENT) 0 02 % inhalation solution 1 mg (1 mg Nebulization Given 12/30/20 1353)   sodium chloride 0 9 % inhalation solution 12 mL (12 mL Nebulization Given 12/30/20 1353)   magnesium sulfate 2 g/50 mL IVPB (premix) 2 g (0 g Intravenous Stopped 12/30/20 1545)   sodium chloride 0 9 % bolus 500 mL (0 mL Intravenous Stopped 12/30/20 1558)   albuterol inhalation solution 5 mg (5 mg Nebulization Given 12/30/20 1733)   ipratropium (ATROVENT) 0 02 % inhalation solution 0 5 mg (0 5 mg Nebulization Given 12/30/20 8646)       Diagnostic Studies  Results Reviewed Procedure Component Value Units Date/Time    Lactic acid 2 Hours [[id number]]  (Abnormal) Collected: 12/30/20 1601    Lab Status: Final result Specimen: Blood from Arm, Left Updated: 12/30/20 1646     LACTIC ACID 2 3 mmol/L     Narrative:      Result may be elevated if tourniquet was used during collection  COVID19, Influenza A/B, RSV PCR, [facility] [[id number]]  (Normal) Collected: 12/30/20 1356    Lab Status: Final result Specimen: Nasopharyngeal Swab Updated: 12/30/20 1446     SARS-CoV-2 Negative     INFLUENZA A PCR Negative     INFLUENZA B PCR Negative     RSV PCR Negative    Narrative: This test has been authorized by FDA under an EUA (Emergency Use Assay) for use by authorized laboratories  Clinical caution and judgement should be used with the interpretation of these results with consideration of the clinical impression and other laboratory testing  Testing reported as "Positive" or "Negative" has been proven to be accurate according to standard laboratory validation requirements  All testing is performed with control materials showing appropriate reactivity at standard intervals  Lactic acid [[id number]]  (Abnormal) Collected: 12/30/20 1341    Lab Status: Final result Specimen: Blood from Arm, Left Updated: 12/30/20 1438     LACTIC ACID 2 5 mmol/L     Narrative:      Result may be elevated if tourniquet was used during collection      Comprehensive metabolic panel [[id number]]  (Abnormal) Collected: 12/30/20 1341    Lab Status: Final result Specimen: Blood from Arm, Left Updated: 12/30/20 1418     Sodium 142 mmol/L      Potassium 3 6 mmol/L      Chloride 107 mmol/L      CO2 25 mmol/L      ANION GAP 10 mmol/L      BUN 13 mg/dL      Creatinine 1 04 mg/dL      Glucose 117 mg/dL      Calcium 9 1 mg/dL      AST 22 U/L      ALT 30 U/L      Alkaline Phosphatase 111 U/L      Total Protein 8 6 g/dL      Albumin 4 5 g/dL      Total Bilirubin 0 73 mg/dL      eGFR 99 ml/min/1 73sq m     Narrative:      [employer] guidelines for Chronic Kidney Disease (CKD):     Stage 1 with normal or high GFR (GFR > 90 mL/min/1 73 square meters)    Stage 2 Mild CKD (GFR = 60-89 mL/min/1 73 square meters)    Stage 3A Moderate CKD (GFR = 45-59 mL/min/1 73 square meters)    Stage 3B Moderate CKD (GFR = 30-44 mL/min/1 73 square meters)    Stage 4 Severe CKD (GFR = 15-29 mL/min/1 73 square meters)    Stage 5 End Stage CKD (GFR <15 mL/min/1 73 square meters)  Note: GFR calculation is accurate only with a steady state creatinine    Magnesium [[id number]]  (Normal) Collected: 12/30/20 1341    Lab Status: Final result Specimen: Blood from Arm, Left Updated: 12/30/20 1418     Magnesium 2 2 mg/dL     Protime-INR [[id number]]  (Normal) Collected: 12/30/20 1341    Lab Status: Final result Specimen: Blood from Arm, Left Updated: 12/30/20 1416     Protime 13 8 seconds      INR 1 08    APTT [[id number]]  (Normal) Collected: 12/30/20 1341    Lab Status: Final result Specimen: Blood from Arm, Left Updated: 12/30/20 1416     PTT 30 seconds     Blood culture #2 [[id number]] Collected: 12/30/20 1352    Lab Status:  In process Specimen: Blood from Hand, Right Updated: 12/30/20 1358    CBC and differential [[id number]]  (Abnormal) Collected: 12/30/20 1341    Lab Status: Final result Specimen: Blood from Arm, Left Updated: 12/30/20 1354     WBC 12 77 Thousand/uL      RBC 5 55 Million/uL      Hemoglobin 18 0 g/dL      Hematocrit 52 7 %      MCV 95 fL      MCH 32 4 pg      MCHC 34 2 g/dL      RDW 12 8 %      MPV 11 1 fL      Platelets 367 Thousands/uL      nRBC 0 /100 WBCs      Neutrophils Relative 69 %      Immat GRANS % 1 %      Lymphocytes Relative 15 %      Monocytes Relative 8 %      Eosinophils Relative 6 %      Basophils Relative 1 %      Neutrophils Absolute 8 77 Thousands/µL      Immature Grans Absolute 0 10 Thousand/uL      Lymphocytes Absolute 1 97 Thousands/µL      Monocytes Absolute 1 05 Thousand/µL      Eosinophils Absolute 0 80 Thousand/µL      Basophils Absolute 0 08 Thousands/µL     Blood culture #1 [[id number]] Collected: 12/30/20 1341    Lab Status: In process Specimen: Blood from Arm, Left Updated: 12/30/20 1347                 XR chest 1 view portable   Final Result by Ravin Ring MD (12/30 4304)      No acute cardiopulmonary disease                    Workstation performed: [id number]                    Procedures  ECG 12 Lead Documentation Only    Date/Time: 12/30/2020 5:44 PM  Performed by: Jef Amin MD  Authorized by: Jef Amin MD     Indications / Diagnosis:  Dyspnea  ECG reviewed by me, the ED Provider: yes    Patient location:  ED  Interpretation:     Interpretation: normal    Rate:     ECG rate assessment: tachycardic    Rhythm:     Rhythm: sinus rhythm    Ectopy:     Ectopy: none    QRS:     QRS axis:  Normal  Conduction:     Conduction: normal    ST segments:     ST segments:  Normal  T waves:     T waves: normal    CriticalCare Time  Performed by: Jef Amin MD  Authorized by: Jef Amin MD     Critical care provider statement:     Critical care time (minutes):  30    Critical care time was exclusive of:  Separately billable procedures and treating other patients and teaching time    Critical care was necessary to treat or prevent imminent or life-threatening deterioration of the following conditions:  Respiratory failure (Acute asthma exacerbation, requiring BiPAP, Sr neb, IV magnesium)    Critical care was time spent personally by me on the following activities:  Blood draw for specimens, obtaining history from patient or surrogate, development of treatment plan with patient or surrogate, discussions with consultants, evaluation of patient's response to treatment, examination of patient, interpretation of cardiac output measurements, ordering and performing treatments and interventions, ordering and review of laboratory studies, ordering and review of radiographic studies, re-evaluation of patient's condition and review of old charts    I assumed direction of critical care for this patient from another provider in my specialty: no               ED Course  ED Course as of Dec 30 1748   Wed Dec 30, 2020   1338 Respiratory called for BIPAP; Sr neb, Magnesium iv ordered, patient already received Solumedrol 125 mg by EMS  1350 Patient taking off BIPAP mask, says he is 'hurting all over', feels anxious, has hx of anxiety, we will give Ativan 1 mg IV       1402 Patient not able to keep the BIPAP mask on at this time, is apologetic, we will continue Nasal Cannula Oxygen with Phylliss Halsted neb, hold off on Ativan as patient not keeping BIPAP on       1435 WBC(!): 12 77   1436 Hemoglobin(!): 18 0   1436 Platelet Count: 362   1436 Sodium: 142   1436 Potassium: 3 6   1436 BUN: 13   1436 Creatinine: 1 04   1436 Labs reviewed  Magnesium: 2 2   1501 SARS-COV-2: Negative   1501 INFLU A PCR: Negative   1501 INFLU B PCR: Negative   1501 COVID/Flu/RSV negative  RSV PCR: Negative   1501 Lactic acid noted, likely related to increased WOB on arrival from Asthma exacerbation, no fever, WBC only marginally elevated at 12 77, CXR pending  LACTIC ACID(!!): 2 5   1707 SARS-COV-2: Negative   1707 INFLU A PCR: Negative   1707 INFLU B PCR: Negative   1707 COVID negative  RSV PCR: Negative   1717 Lactic acid coming down, no source of infection, chest x-ray normal; COVID/Flu/RSV negative  We will give IVF, hold off on Antibiotics as lactic acid probably from asthma exacerbation and inc WOB  Discussed with Dr Jaxon Tatum, agree with that  LACTIC ACID(!!): 2 3                             SBIRT 20yo+      Most Recent Value   SBIRT (22 yo +)   In order to provide better care to our patients, we are screening all of our patients for alcohol and drug use  Would it be okay to ask you these screening questions?   Unable to answer at this time Filed at: 12/30/2020 1334                    MDM  Number of Diagnoses or Management Options  Asthma exacerbation: new and requires workup  Diagnosis management comments: Patient is a 19-year-old male, history of asthma, comes in with complaints of worsening shortness of breath, cough for the past 2 days, denies fever, chills, plan, chest pain, no sick contacts, no travel history  On exam, patient is conscious, speaking short sentences, appears diaphoretic, tachypneic, tachycardic, low oxygen saturation low 90s, lung exam shows diffuse wheezing bilaterally  Impression:  Acute Asthma exacerbation, respiratory distress, will try BiPAP, give Heart neb, Solu-Medrol, magnesium, IV fluids, check labs, COVID, chest x-ray, EKG  Patient presents with initial SIRS due to tachypnea and tachycardia, no fever, we will send lactic acid and blood cultures as part of sepsis protocol; however SIRS may be due to asthma exacerbation and increased work of breathing as opposed to infectious source  Amount and/or Complexity of Data Reviewed  Clinical lab tests: reviewed and ordered  Tests in the radiology section of CPT®: ordered and reviewed  Tests in the medicine section of CPT®: ordered and reviewed  Discuss the patient with other providers: yes  Independent visualization of images, tracings, or specimens: yes        Disposition  Final diagnoses:   Asthma exacerbation     Time reflects when diagnosis was documented in both MDM as applicable and the Disposition within this note     Time User Action Codes Description Comment    12/30/2020  5:25 PM Latha Ibarra Add [[id number] [id number]] Asthma exacerbation       ED Disposition     ED Disposition Condition Date/Time Comment    Admit Stable Wed Dec 30, 2020  5:25 PM Case was discussed with Dr Tanesha Sloan and the patient's admission status was agreed to be Admission Status: observation status to the service of Dr Tanesha Sloan  Follow-up Information    None         Patient's Medications   Discharge Prescriptions    No medications on file     No discharge procedures on file      PDMP Review None          ED Provider  Electronically Signed by           Rogelio Garcia MD  12/30/20 3422

## 2020-12-31 VITALS
OXYGEN SATURATION: 92 % | BODY MASS INDEX: 34.28 KG/M2 | WEIGHT: 226.19 LBS | HEIGHT: 68 IN | SYSTOLIC BLOOD PRESSURE: 144 MMHG | DIASTOLIC BLOOD PRESSURE: 91 MMHG | HEART RATE: 117 BPM | TEMPERATURE: 96.9 F | RESPIRATION RATE: 18 BRPM

## 2020-12-31 LAB
ALBUMIN SERPL BCP-MCNC: 3.9 G/DL (ref 3.5–5)
ALP SERPL-CCNC: 99 U/L (ref 46–116)
ALT SERPL W P-5'-P-CCNC: 25 U/L (ref 12–78)
ANION GAP SERPL CALCULATED.3IONS-SCNC: 7 MMOL/L (ref 4–13)
AST SERPL W P-5'-P-CCNC: 13 U/L (ref 5–45)
BILIRUB SERPL-MCNC: 0.53 MG/DL (ref 0.2–1)
BUN SERPL-MCNC: 10 MG/DL (ref 5–25)
CALCIUM SERPL-MCNC: 8.6 MG/DL (ref 8.3–10.1)
CHLORIDE SERPL-SCNC: 106 MMOL/L (ref 100–108)
CO2 SERPL-SCNC: 25 MMOL/L (ref 21–32)
CREAT SERPL-MCNC: 0.91 MG/DL (ref 0.6–1.3)
ERYTHROCYTE [DISTWIDTH] IN BLOOD BY AUTOMATED COUNT: 12.9 % (ref 11.6–15.1)
GFR SERPL CREATININE-BSD FRML MDRD: 117 ML/MIN/1.73SQ M
GLUCOSE SERPL-MCNC: 131 MG/DL (ref 65–140)
HCT VFR BLD AUTO: 49.8 % (ref 36.5–49.3)
HGB BLD-MCNC: 16.6 G/DL (ref 12–17)
LACTATE SERPL-SCNC: 1.8 MMOL/L (ref 0.5–2)
MCH RBC QN AUTO: 31.8 PG (ref 26.8–34.3)
MCHC RBC AUTO-ENTMCNC: 33.3 G/DL (ref 31.4–37.4)
MCV RBC AUTO: 95 FL (ref 82–98)
PLATELET # BLD AUTO: 216 THOUSANDS/UL (ref 149–390)
PMV BLD AUTO: 11.2 FL (ref 8.9–12.7)
POTASSIUM SERPL-SCNC: 4.3 MMOL/L (ref 3.5–5.3)
PROT SERPL-MCNC: 7.9 G/DL (ref 6.4–8.2)
RBC # BLD AUTO: 5.22 MILLION/UL (ref 3.88–5.62)
SODIUM SERPL-SCNC: 138 MMOL/L (ref 136–145)
TSH SERPL DL<=0.05 MIU/L-ACNC: 0.89 UIU/ML (ref 0.36–3.74)
WBC # BLD AUTO: 14.48 THOUSAND/UL (ref 4.31–10.16)

## 2020-12-31 PROCEDURE — 94760 N-INVAS EAR/PLS OXIMETRY 1: CPT

## 2020-12-31 PROCEDURE — 80053 COMPREHEN METABOLIC PANEL: CPT | Performed by: INTERNAL MEDICINE

## 2020-12-31 PROCEDURE — 94640 AIRWAY INHALATION TREATMENT: CPT

## 2020-12-31 PROCEDURE — 83605 ASSAY OF LACTIC ACID: CPT | Performed by: INTERNAL MEDICINE

## 2020-12-31 PROCEDURE — 99217 PR OBSERVATION CARE DISCHARGE MANAGEMENT: CPT | Performed by: INTERNAL MEDICINE

## 2020-12-31 PROCEDURE — 85027 COMPLETE CBC AUTOMATED: CPT | Performed by: INTERNAL MEDICINE

## 2020-12-31 RX ORDER — BENZONATATE 200 MG/1
200 CAPSULE ORAL 3 TIMES DAILY
Qty: 20 CAPSULE | Refills: 0 | Status: SHIPPED | OUTPATIENT
Start: 2020-12-31

## 2020-12-31 RX ORDER — LEVOFLOXACIN 750 MG/1
750 TABLET ORAL EVERY 24 HOURS
Qty: 5 TABLET | Refills: 0 | Status: SHIPPED | OUTPATIENT
Start: 2020-12-31 | End: 2020-12-31 | Stop reason: SDUPTHER

## 2020-12-31 RX ORDER — PREDNISONE 10 MG/1
TABLET ORAL
Qty: 30 TABLET | Refills: 0 | Status: SHIPPED | OUTPATIENT
Start: 2021-01-01 | End: 2020-12-31 | Stop reason: SDUPTHER

## 2020-12-31 RX ORDER — BENZONATATE 200 MG/1
200 CAPSULE ORAL 3 TIMES DAILY
Qty: 20 CAPSULE | Refills: 0 | Status: SHIPPED | OUTPATIENT
Start: 2020-12-31 | End: 2020-12-31

## 2020-12-31 RX ORDER — PREDNISONE 10 MG/1
TABLET ORAL
Qty: 30 TABLET | Refills: 0 | Status: SHIPPED | OUTPATIENT
Start: 2021-01-01

## 2020-12-31 RX ORDER — GUAIFENESIN 600 MG
1200 TABLET, EXTENDED RELEASE 12 HR ORAL EVERY 12 HOURS SCHEDULED
Status: DISCONTINUED | OUTPATIENT
Start: 2020-12-31 | End: 2020-12-31 | Stop reason: HOSPADM

## 2020-12-31 RX ORDER — LEVOFLOXACIN 750 MG/1
750 TABLET ORAL EVERY 24 HOURS
Qty: 5 TABLET | Refills: 0 | Status: SHIPPED | OUTPATIENT
Start: 2020-12-31 | End: 2021-01-05

## 2020-12-31 RX ORDER — ALBUTEROL SULFATE 90 UG/1
2 AEROSOL, METERED RESPIRATORY (INHALATION) EVERY 4 HOURS
Qty: 1 INHALER | Refills: 0 | Status: SHIPPED | OUTPATIENT
Start: 2020-12-31

## 2020-12-31 RX ORDER — BENZONATATE 100 MG/1
200 CAPSULE ORAL 3 TIMES DAILY
Status: DISCONTINUED | OUTPATIENT
Start: 2020-12-31 | End: 2020-12-31 | Stop reason: HOSPADM

## 2020-12-31 RX ADMIN — ALBUTEROL SULFATE 2 PUFF: 90 AEROSOL, METERED RESPIRATORY (INHALATION) at 10:49

## 2020-12-31 RX ADMIN — ALBUTEROL SULFATE 2 PUFF: 90 AEROSOL, METERED RESPIRATORY (INHALATION) at 03:38

## 2020-12-31 RX ADMIN — LEVALBUTEROL HYDROCHLORIDE 1.25 MG: 1.25 SOLUTION, CONCENTRATE RESPIRATORY (INHALATION) at 07:44

## 2020-12-31 RX ADMIN — DICLOFENAC SODIUM 2 G: 10 GEL TOPICAL at 12:05

## 2020-12-31 RX ADMIN — LEVALBUTEROL HYDROCHLORIDE 1.25 MG: 1.25 SOLUTION, CONCENTRATE RESPIRATORY (INHALATION) at 13:00

## 2020-12-31 RX ADMIN — LORATADINE 10 MG: 10 TABLET ORAL at 08:17

## 2020-12-31 RX ADMIN — IPRATROPIUM BROMIDE 0.5 MG: 0.5 SOLUTION RESPIRATORY (INHALATION) at 07:44

## 2020-12-31 RX ADMIN — BENZONATATE 200 MG: 100 CAPSULE ORAL at 03:53

## 2020-12-31 RX ADMIN — METHYLPREDNISOLONE SODIUM SUCCINATE 40 MG: 40 INJECTION, POWDER, FOR SOLUTION INTRAMUSCULAR; INTRAVENOUS at 05:21

## 2020-12-31 RX ADMIN — LEVALBUTEROL HYDROCHLORIDE 1.25 MG: 1.25 SOLUTION, CONCENTRATE RESPIRATORY (INHALATION) at 00:57

## 2020-12-31 RX ADMIN — SODIUM CHLORIDE 100 ML/HR: 0.9 INJECTION, SOLUTION INTRAVENOUS at 05:21

## 2020-12-31 RX ADMIN — DICLOFENAC SODIUM 2 G: 10 GEL TOPICAL at 08:18

## 2020-12-31 RX ADMIN — METHYLPREDNISOLONE SODIUM SUCCINATE 40 MG: 40 INJECTION, POWDER, FOR SOLUTION INTRAMUSCULAR; INTRAVENOUS at 13:49

## 2020-12-31 RX ADMIN — BENZONATATE 200 MG: 100 CAPSULE ORAL at 08:17

## 2020-12-31 RX ADMIN — GUAIFENESIN 1200 MG: 600 TABLET ORAL at 08:17

## 2020-12-31 RX ADMIN — IPRATROPIUM BROMIDE 0.5 MG: 0.5 SOLUTION RESPIRATORY (INHALATION) at 00:57

## 2020-12-31 RX ADMIN — IPRATROPIUM BROMIDE 0.5 MG: 0.5 SOLUTION RESPIRATORY (INHALATION) at 13:00

## 2020-12-31 NOTE — UTILIZATION REVIEW
Initial Clinical Review    Admission: Date/Time/Statement:   Admission Orders (From admission, onward)     Ordered        12/30/20 1725  Place in Observation  Once                   Orders Placed This Encounter   Procedures    Place in Observation     Standing Status:   Standing     Number of Occurrences:   1     Order Specific Question:   Admitting Physician     Answer:   [id number]     Order Specific Question:   Level of Care     Answer:   Med Surg [16]     ED Arrival Information     Expected Arrival Acuity Means of Arrival Escorted By Service Admission Type    - 12/30/2020 13:29 [facility] ([address]) Hospitalist Emergency    Arrival Complaint    asthma        Chief Complaint   Patient presents with    Asthma     Pt arrives via EMS - reports asthma exacerbation  Using inhaler without relief  terbutaline and solu medrol given pre hospital       Assessment/Plan:    22  Y O male  Presents to ED via  EMS  From home with increasing shortness of  Breath for past  Several days  Has been using  Albuterol without relief  PMH  Is  Asthma, multiple past  Admissions, never  Required intubation  Given terbutaline and  S/medrol en route  On ED  Arrival, required  15  L  NC,  Now  On  6 L NC to keep sats  >  90  %  Admit  Observation  With  Acute respiratory failure with hypoxia, Exacerbation of  Asthma, elevated lactic acid and Sinus tachycardia and plan is  O2  6L NC, IVF, SIMONE, IV  S/,medrol and nebulizer treatments          ED Triage Vitals   Temperature Pulse Respirations Blood Pressure SpO2   12/30/20 1348 12/30/20 1336 12/30/20 1336 12/30/20 1336 12/30/20 1336   98 6 °F (37 °C) (!) 132 (!) 36 (!) 181/111 95 %      Temp Source Heart Rate Source Patient Position - Orthostatic VS BP Location FiO2 (%)   12/30/20 1348 12/30/20 1415 12/30/20 1559 12/30/20 1559 --   Temporal Monitor Lying Right arm       Pain Score       12/30/20 1336       6          Wt Readings from Last 1 Encounters:   12/30/20 103 kg (226 lb 3 1 oz)     Additional Vital Signs:    12/31/20 0747          95 %  44    6 L/min  Nasal cannula     12/31/20 0700  97 4 °F (36 3 °C)Abnormal   102  18  133/83  95 %        Nasal cannula     12/31/20 0057          92 %  44    6 L/min  Nasal cannula     12/30/20 2300  99 4 °F (37 4 °C)  112Abnormal   20  129/73  91 %  44    6 L/min  Nasal cannula  Lying   12/30/20 2133          92 %  44    6 L/min  Nasal cannula     12/30/20 1959  99 3 °F (37 4 °C)  121Abnormal   22  152/92  90 %  44    6 L/min  Nasal cannula  Sitting   12/30/20 1906  98 2 °F (36 8 °C)  112Abnormal   20  127/67  93 %   44    6 L/min  Nasal cannula  Lying   SpO2: Dr Maria L Sawyer aware of HR and pulse ox   at 12/30/20 1906   12/30/20 1730    107Abnormal   20    94 %  44    6 L/min  Nasal cannula     12/30/20 1559    115Abnormal   22  136/66  94 %  44    6 L/min  Nasal cannula  Lying   12/30/20 1415    120Abnormal   28Abnormal     94 %        Simple mask     12/30/20 1348  98 6 °F (37 °C)                     12/30/20 1336    132Abnormal   36Abnormal   181/111Abnormal   95 %    15 L/min    Non-rebreather mask           Pertinent Labs/Diagnostic Test Results:   CXR  ( 12/30)   NAD  EKG   SR   ST  Results from last 7 days   Lab Units 12/30/20  1356   SARS-COV-2  Negative     Results from last 7 days   Lab Units 12/31/20  0532 12/30/20  1341   WBC Thousand/uL 14 48* 12 77*   HEMOGLOBIN g/dL 16 6 18 0*   HEMATOCRIT % 49 8* 52 7*   PLATELETS Thousands/uL 216 211   NEUTROS ABS Thousands/µL  --  8 77*         Results from last 7 days   Lab Units 12/31/20  0532 12/30/20  1341   SODIUM mmol/L 138 142   POTASSIUM mmol/L 4 3 3 6   CHLORIDE mmol/L 106 107   CO2 mmol/L 25 25   ANION GAP mmol/L 7 10   BUN mg/dL 10 13   CREATININE mg/dL 0 91 1 04   EGFR ml/min/1 73sq m 117 99   CALCIUM mg/dL 8 6 9 1   MAGNESIUM mg/dL  --  2 2     Results from last 7 days   Lab Units 12/31/20  0532 12/30/20  1341   AST U/L 13 22   ALT U/L 25 30   ALK PHOS U/L 99 111   TOTAL PROTEIN g/dL 7 9 8 6*   ALBUMIN g/dL 3 9 4 5   TOTAL BILIRUBIN mg/dL 0 53 0 73         Results from last 7 days   Lab Units 12/31/20  0532 12/30/20  1341   GLUCOSE RANDOM mg/dL 131 117           Results from last 7 days   Lab Units 12/30/20  1341   PROTIME seconds 13 8   INR  1 08   PTT seconds 30     Results from last 7 days   Lab Units 12/30/20  1341   TSH 3RD GENERATON uIU/mL 0 890         Results from last 7 days   Lab Units 12/31/20  0532 12/30/20  1601 12/30/20  1341   LACTIC ACID mmol/L 1 8 2 3* 2 5*           Results from last 7 days   Lab Units 12/30/20  1356   INFLUENZA A PCR  Negative   INFLUENZA B PCR  Negative   RSV PCR  Negative           Results from last 7 days   Lab Units 12/30/20  1352 12/30/20  1341   BLOOD CULTURE  Received in Microbiology Lab  Culture in Progress  Received in Microbiology Lab  Culture in Progress                 ED Treatment:   Medication Administration from 12/30/2020 1328 to 12/30/2020 1922       Date/Time Order Dose Route Action Comments     12/30/2020 1336 terbutaline (FOR EMS ONLY) (BRETHINE) injection 1 each 0 each Does not apply Given to EMS      12/30/2020 1336 methylPREDNISolone sodium succinate (FOR EMS ONLY) (Solu-MEDROL) 125 MG injection 125 mg 0 mg Does not apply Given to EMS      12/30/2020 1353 albuterol inhalation solution 10 mg 10 mg Nebulization Given      12/30/2020 1353 ipratropium (ATROVENT) 0 02 % inhalation solution 1 mg 1 mg Nebulization Given      12/30/2020 1353 sodium chloride 0 9 % inhalation solution 12 mL 12 mL Nebulization Given      12/30/2020 1545 magnesium sulfate 2 g/50 mL IVPB (premix) 2 g 0 g Intravenous Stopped      12/30/2020 1345 magnesium sulfate 2 g/50 mL IVPB (premix) 2 g 2 g Intravenous New Bag      12/30/2020 1558 sodium chloride 0 9 % bolus 500 mL 0 mL Intravenous Stopped      12/30/2020 1345 sodium chloride 0 9 % bolus 500 mL 500 mL Intravenous New Bag 12/30/2020 1354 LORazepam (ATIVAN) injection 1 mg 0 mg Intravenous Hold Incase of BiPap     12/30/2020 1733 sodium chloride 0 9 % bolus 1,000 mL 1,000 mL Intravenous New Bag      12/30/2020 1733 albuterol inhalation solution 5 mg 5 mg Nebulization Given      12/30/2020 1733 ipratropium (ATROVENT) 0 02 % inhalation solution 0 5 mg 0 5 mg Nebulization Given         Present on Admission:   Acute respiratory failure with hypoxia (HCC)   Asthma exacerbation   Lactic acidosis   Allergic rhinitis   Elevated lactic acid level   Sinus tachycardia      Admitting Diagnosis: Asthma [J45 909]  Asthma exacerbation [J45 901]  Age/Sex: 22 y o  male  Admission Orders:  Scheduled Medications:  benzonatate, 200 mg, Oral, TID  Diclofenac Sodium, 2 g, Topical, 4x Daily  guaiFENesin, 1,200 mg, Oral, Q12H MICHELLE  ipratropium, 0 5 mg, Nebulization, Q6H  levalbuterol, 1 25 mg, Nebulization, Q6H  levofloxacin, 500 mg, Intravenous, Q24H  loratadine, 10 mg, Oral, Daily  methylPREDNISolone sodium succinate, 40 mg, Intravenous, Q8H [facility]  nicotine, 1 patch, Transdermal, Daily      Continuous IV Infusions:  sodium chloride, 100 mL/hr, Intravenous, Continuous      PRN Meds:  acetaminophen, 650 mg, Oral, Q6H PRN  albuterol, 2 puff, Inhalation, Q4H PRN  LORazepam, 0 5 mg, Intravenous, 4x Daily PRN  magnesium hydroxide, 30 mL, Oral, BID PRN  ondansetron, 4 mg, Intravenous, Q4H PRN  oxyCODONE, 5 mg, Oral, Q4H PRN        IP CONSULT TO CASE MANAGEMENT    Network Utilization Review Department  ATTENTION: Please call with any questions or concerns to [phone] and carefully listen to the prompts so that you are directed to the right person  All voicemails are confidential   [place] all requests for admission clinical reviews, approved or denied determinations and any other requests to dedicated fax number below belonging to the campus where the patient is receiving treatment   List of dedicated fax numbers for the Facilities:  [facility] ADMISSION DENIALS (Administrative/Medical Necessity) [phone]   [address] (Maternity/NICU/Pediatrics) [address] [facility] [facility] 40 125 [facility]  [phone]   Francisca Sparrow 6937 (  Rossy Deutsch "Lillian" 103) [address] [address] [place] [address] [phone]   [place] [address] [phone]

## 2020-12-31 NOTE — ASSESSMENT & PLAN NOTE
· Elevated lactic acid secondary to hypoxia    Recheck in a m  after IV fluids    Results from last 7 days   Lab Units 12/30/20  1601 12/30/20  1341   LACTIC ACID mmol/L 2 3* 2 5*

## 2020-12-31 NOTE — NURSING NOTE
Pt was offered tobacco cessation counseling and refused  Advised to follow up with PCP for any further questions  Pt denies any unmet needs or concerns at this time

## 2020-12-31 NOTE — PLAN OF CARE
Problem: RESPIRATORY - ADULT  Goal: Achieves optimal ventilation and oxygenation  Description: INTERVENTIONS:  - Assess for changes in respiratory status  - Assess for changes in mentation and behavior  - Position to facilitate oxygenation and minimize respiratory effort  - Oxygen administered by appropriate delivery if ordered  - Initiate smoking cessation education as indicated  - Encourage broncho-pulmonary hygiene including cough, deep breathe, Incentive Spirometry  - Assess the need for suctioning and aspirate as needed  - Assess and instruct to report SOB or any respiratory difficulty  - Respiratory Therapy support as indicated  12/31/2020 1641 by Jocelin Garay RN  Outcome: Adequate for Discharge  12/31/2020 1441 by Jocelin Garay RN  Outcome: Progressing     Problem: PAIN - ADULT  Goal: Verbalizes/displays adequate comfort level or baseline comfort level  Description: Interventions:  - Encourage patient to monitor pain and request assistance  - Assess pain using appropriate pain scale  - Administer analgesics based on type and severity of pain and evaluate response  - Implement non-pharmacological measures as appropriate and evaluate response  - Consider cultural and social influences on pain and pain management  - Notify physician/advanced practitioner if interventions unsuccessful or patient reports new pain  Outcome: Adequate for Discharge     Problem: SAFETY ADULT  Goal: Patient will remain free of falls  Description: INTERVENTIONS:  - Assess patient frequently for physical needs  -  Identify cognitive and physical deficits and behaviors that affect risk of falls    -  [place] fall precautions as indicated by assessment   - Educate patient on patient safety including physical limitations  - Instruct patient to call for assistance with activity based on assessment    Outcome: Adequate for Discharge     Problem: DISCHARGE PLANNING  Goal: Discharge to home or other facility with appropriate resources  Description: INTERVENTIONS:  - Identify barriers to discharge w/patient and caregiver  - Arrange for needed discharge resources and transportation as appropriate, assistance in obtaining respiratory medications  - Identify discharge learning needs   - Refer to Case Management Department for coordinating discharge planning if the patient needs post-hospital services based on physician/advanced practitioner order or complex needs related to functional status, cognitive ability, or social support system  Outcome: Adequate for Discharge     Problem: Knowledge Deficit  Goal: Patient/family/caregiver demonstrates understanding of disease process, treatment plan, medications, and discharge instructions  Description: Complete learning assessment and assess knowledge base    Interventions:  - Provide teaching at level of understanding  - Provide teaching via preferred learning methods  Outcome: Adequate for Discharge     Problem: COPING  Goal: Will report anxiety at manageable levels  Description: INTERVENTIONS:  - Administer medication as ordered  - Teach and encourage coping skills  - Provide emotional support  - Assess patient/family for anxiety and ability to cope  Outcome: Adequate for Discharge

## 2020-12-31 NOTE — H&P
H&P- Rayfield Cordial 1995, 22 y o  male MRN: [id number]    Unit/Bed#: ED 22 Encounter: [id number]    Primary Care Provider: Mendel Montoya MD   Date and time admitted to hospital: 12/30/2020  1:29 PM        Assessment and Plan  * [facility] with [facility] ([facility] )  Assessment & Plan  · Acute respiratory failure with hypoxia secondary to severe asthma/exacerbation and tracheobronchitis  · Upon arrival required 15 L nasal cannula; currently require 6 L nasal cannula to maintain oxygen saturations above 90%    Asthma exacerbation  Assessment & Plan  · Asthma without exacerbation  Continue IV methylprednisolone and nebulized bronchodilators  · Will start levofloxacin for tracheobronchitis    Sinus tachycardia  Assessment & Plan  · Sinus tachycardia likely secondary to asthma exacerbation  Check TSH for completeness    Elevated lactic acid level  Assessment & Plan  · Elevated lactic acid secondary to hypoxia  Recheck in a m  after IV fluids    Results from last 7 days   Lab Units 12/30/20  1601 12/30/20  1341   LACTIC ACID mmol/L 2 3* 2 5*       Allergic rhinitis  Assessment & Plan  · Allergic rhinitis will substitute loratadine for cetirizine based on hospital formulary      VTE Prophylaxis: Low risk  Code Status: Level 1 - Full Code  Anticipated Length of Stay:  Patient will be admitted on an Observation basis with an anticipated length of stay of  less than 2 midnights  Justification for Hospital Stay: Acute respiratory failure with hypoxia ([facility] )  Total Time for Visit, including Counseling / Coordination of Care: x mins  Greater than 50% of this total time spent on direct patient counseling and coordination of care  Chief Complaint:     Chief Complaint   Patient presents with    Asthma     Pt arrives via EMS - reports asthma exacerbation  Using inhaler without relief   terbutaline and solu medrol given pre hospital       History of Present Illness:    Og Arteaga is a 22 y o  male with a past medical history of asthma who presents with worsening shortness of breath  The patient has had multiple hospitalizations but never required intubation  He states that he went to [facility] on Christmas to visit family and when he came back and worsening shortness of breath since Monday  Despite using albuterol this has not helped  EMS was summoned and the patient was brought here to the hospital where he was given terbutaline and methylprednisolone on route  He denies any chest pain or fevers    Review of Systems:  Review of Systems   Constitutional: Positive for fatigue  Negative for chills, diaphoresis and fever  HENT: Negative for dental problem and facial swelling  Eyes: Negative for photophobia, pain and visual disturbance  Respiratory: Positive for cough and shortness of breath  Negative for wheezing and stridor  Cardiovascular: Negative for chest pain and palpitations  Gastrointestinal: Negative for abdominal distention, abdominal pain, diarrhea, nausea and vomiting  Genitourinary: Negative for dysuria, hematuria and urgency  Musculoskeletal: Negative for back pain and myalgias  Skin: Negative for rash  Neurological: Negative for dizziness, seizures, speech difficulty, numbness and headaches  Psychiatric/Behavioral: Negative for agitation and suicidal ideas  The patient is not nervous/anxious  All other systems reviewed and are negative  Past Medical and Surgical History:   Past Medical History:   Diagnosis Date    Allergic rhinitis     Asthma      History reviewed  No pertinent surgical history  Meds/Allergies: Allergies: No Known Allergies  Prior to Admission Medications   Prescriptions Last Dose Informant Patient Reported? Taking?    albuterol (PROVENTIL HFA,VENTOLIN HFA) 90 mcg/act inhaler   Yes Yes   Sig: Inhale 2 puffs every 4 (four) hours   cetirizine (ZyrTEC) 10 mg tablet   No Yes   Sig: Take 1 tablet (10 mg total) by mouth daily guaiFENesin (MUCINEX) 600 mg 12 hr tablet   Yes Yes   Sig: Take 600 mg by mouth every 12 (twelve) hours       Facility-Administered Medications: None     Social History:     Social History     Socioeconomic History    Marital status: Single     Spouse name: Not on file    Number of children: Not on file    Years of education: Not on file    Highest education level: Not on file   Occupational History     Employer: [employer] resource strain: Not on file    Food insecurity     Worry: Not on file     Inability: Not on file    Transportation needs     Medical: Not on file     Non-medical: Not on file   Tobacco Use    Smoking status: Current Some Day Smoker    Smokeless tobacco: Never Used    Tobacco comment: No passive smoke exposure   Substance and Sexual Activity    Alcohol use: Yes     Comment: occasional     Drug use: No    Sexual activity: Not on file   Lifestyle    Physical activity     Days per week: Not on file     Minutes per session: Not on file    Stress: Not on file   Relationships    Social connections     Talks on phone: Not on file     Gets together: Not on file     Attends Worship service: Not on file     Active member of club or organization: Not on file     Attends meetings of clubs or organizations: Not on file     Relationship status: Not on file    Intimate partner violence     Fear of current or ex partner: Not on file     Emotionally abused: Not on file     Physically abused: Not on file     Forced sexual activity: Not on file   Other Topics Concern    Not on file   Social History Narrative    Not on file     Patient Pre-hospital Living Situation:   Patient Pre-hospital Level of Mobility:   Patient Pre-hospital Diet Restrictions:     Family History:  Family History   Problem Relation Age of Onset    Diabetes type II Maternal Grandmother         Mellitus    Diabetes type II Paternal Uncle         Mellitus     Physical Exam:   Vitals:   Blood Pressure: 127/67 (12/30/20 1906)  Pulse: (!) 112 (12/30/20 1906)  Temperature: 98 2 °F (36 8 °C) (12/30/20 1906)  Temp Source: Temporal (12/30/20 1906)  Respirations: 20 (12/30/20 1906)  Weight - Scale: 103 kg (226 lb 3 1 oz) (12/30/20 1336)  SpO2: 93 %(Dr Pham aware of HR and pulse ox ) (12/30/20 1906)    Physical Exam  Vitals signs reviewed  Constitutional:       General: He is not in acute distress  HENT:      Head: Normocephalic and atraumatic  Mouth/Throat:      Mouth: Mucous membranes are moist    Eyes:      Extraocular Movements: Extraocular movements intact  Pupils: Pupils are equal, round, and reactive to light  Neck:      Musculoskeletal: Normal range of motion and neck supple  Cardiovascular:      Rate and Rhythm: Regular rhythm  Heart sounds: Normal heart sounds  Pulmonary:      Effort: Pulmonary effort is normal       Breath sounds: Decreased breath sounds and wheezing present  Abdominal:      General: Bowel sounds are normal       Palpations: Abdomen is soft  Tenderness: There is no abdominal tenderness  There is no rebound  Musculoskeletal:         General: No swelling or tenderness  Skin:     General: Skin is warm and dry  Neurological:      General: No focal deficit present  Mental Status: He is alert and oriented to person, place, and time  Cranial Nerves: No cranial nerve deficit  Psychiatric:         Mood and Affect: Mood normal        Lab Results: I have personally reviewed pertinent reports      Results from last 7 days   Lab Units 12/30/20  1341   WBC Thousand/uL 12 77*   HEMOGLOBIN g/dL 18 0*   HEMATOCRIT % 52 7*   PLATELETS Thousands/uL 211   NEUTROS PCT % 69   LYMPHS PCT % 15   MONOS PCT % 8   EOS PCT % 6     Results from last 7 days   Lab Units 12/30/20  1341   SODIUM mmol/L 142   POTASSIUM mmol/L 3 6   CHLORIDE mmol/L 107   CO2 mmol/L 25   ANION GAP mmol/L 10   BUN mg/dL 13   CREATININE mg/dL 1 04   CALCIUM mg/dL 9 1   ALBUMIN g/dL 4 5 TOTAL BILIRUBIN mg/dL 0 73   ALK PHOS U/L 111   ALT U/L 30   AST U/L 22   EGFR ml/min/1 73sq m 99   GLUCOSE RANDOM mg/dL 117     Results from last 7 days   Lab Units 12/30/20  1341   INR  1 08         Results from last 7 days   Lab Units 12/30/20  1601 12/30/20  1341   LACTIC ACID mmol/L 2 3* 2 5*                    Invalid input(s): URIBILINOGEN        Results from last 7 days   Lab Units 12/30/20  1356   INFLUENZA A PCR  Negative   INFLUENZA B PCR  Negative   RSV PCR  Negative     Imaging: I have personally reviewed pertinent films in PACS  Xr Chest 1 View Portable    Result Date: 12/30/2020  Impression: No acute cardiopulmonary disease  Workstation performed: [id number]       EKG, Pathology, and Other Studies Reviewed on Admission:   EKG  Result Date: 12/30/20  Personally reviewed strips with impression of:  Sinus tachycardia 133 bpm    Allscripts/ Epic Records Reviewed: Yes    ** Please Note: This note has been constructed using a voice recognition system   **

## 2020-12-31 NOTE — PROGRESS NOTES
Respiratory removed patients oxygen , his pulse ox is 94% on RA  Patient states he is feeling much better

## 2020-12-31 NOTE — ASSESSMENT & PLAN NOTE
· Acute respiratory failure with hypoxia secondary to severe asthma/exacerbation and tracheobronchitis  · Upon arrival required 15 L nasal cannula; currently require 6 L nasal cannula to maintain oxygen saturations above 90%

## 2020-12-31 NOTE — ASSESSMENT & PLAN NOTE
· Asthma without exacerbation    Continue IV methylprednisolone and nebulized bronchodilators  · Will start levofloxacin for tracheobronchitis

## 2020-12-31 NOTE — DISCHARGE SUMMARY
Discharge- Randee Riggins 1995, 22 y o  male MRN: [id number]    Unit/Bed#: E2 -01 Encounter: [id number]    Primary Care Provider: Wanda Trujillo MD   Date and time admitted to hospital: 12/30/2020  1:29 PM        * Acute respiratory failure with hypoxia [facility])  Assessment & Plan  · Acute respiratory failure with hypoxia secondary to severe asthma/exacerbation and tracheobronchitis  · Upon arrival required 15 L nasal cannula; current saturating well on room air    Asthma exacerbation  Assessment & Plan  · Asthma without exacerbation  · Will discharge on prednisone taper and Levaquin    Allergic rhinitis  Assessment & Plan  · Allergic rhinitis will substitute loratadine for cetirizine based on hospital formulary        Transition of Care Discharge Summary - [facility]    Patient Information: Randee Riggins 22 y o  male MRN: [id number]  Unit/Bed#: E2 -01 Encounter: [id number]    Discharging Physician / Practitioner: Harriet Cornejo MD  PCP: Wanda Trujillo MD  Admission Date: 12/30/2020  Discharge Date: 12/31/20    Disposition:      Other: home      Reason for Admission: dyspnea    Discharge Diagnoses:     Principal Problem:    Acute respiratory failure with hypoxia ([address] )  Active Problems:    Allergic rhinitis    Asthma exacerbation    Lactic acidosis    Elevated lactic acid level    Sinus tachycardia  Resolved Problems:    * No resolved hospital problems  *      Consultations During Hospital Stay:  · IP CONSULT TO CASE MANAGEMENT      Medication Adjustments and Discharge Medications:  · Medication Dosing Tapers - Please refer to Discharge Medication List for details on any medication dosing tapers (if applicable to patient)  · Discharge Medication List: See after visit summary for reconciled discharge medications  Wound Care Recommendations:  When applicable, please see wound care section of After Visit Summary      Diet Recommendations at Discharge:  Diet - Diet Orders   (From admission, onward)             Start     Ordered    12/30/20 1923  Diet Regular; Regular House  Diet effective now     Question Answer Comment   Diet Type Regular    Regular Regular House    RD to adjust diet per protocol? Yes        12/30/20 1923              Fluid Restriction - No Fluid Restriction at Discharge  Significant Findings / Test Results:     Chest x-ray negative for any acute cardiopulmonary disease    Hospital Course:     Toya Cunningham is a 22 y o  male patient who originally presented to the hospital on 12/30/2020 due to dyspnea  Patient has history of asthma and has had multiple hospitalizations in the past however never required intubation  Patient initially required 15 L oxygen was delivered to 6 L morning currently saturating well on room air  Patient does have some scattered wheezing, I did advise that patient should remain in hospital 1 more day however patient is very adamant that he wants to leave  At this point decision made to discharge patient with proper discharge paperwork and medications  Will discharge on a prednisone taper with close outpatient follow-up  Educated patient if his symptoms worsen he should return to the emergency room  Please see above problem list for further details        Condition at Discharge: good     Discharge Day Visit / Exam:     Subjective:  Patient seen and examined at bedside, states he feels well    Vitals: Blood Pressure: 133/83 (12/31/20 0700)  Pulse: 102 (12/31/20 0700)  Temperature: (!) 97 4 °F (36 3 °C) (12/31/20 0700)  Temp Source: Temporal (12/31/20 0700)  Respirations: 18 (12/31/20 0700)  Height: 5' 8" (172 7 cm) (12/30/20 1959)  Weight - Scale: 103 kg (226 lb 3 1 oz) (12/30/20 1336)  SpO2: 94 % (12/31/20 1300)    Physical Exam:    Constitutional: Patient is oriented to person, place and time, no acute distress  HEENT:  Normocephalic, atraumatic  Cardiovascular: Normal S1S2, RRR, No murmurs/rubs/gallops appreciated  Pulmonary:  Bilateral air entry, No rhonchi/rales/wheezing appreciated  Abdominal: Soft, Bowel sounds present, Non-tender, Non-distended  Extremities:  No cyanosis, clubbing or edema  Neurological: Cranial nerves II-XII grossly intact, sensation intact, otherwise no focal neurological symptoms  Discharge instructions/Information to patient and family:   See after visit summary section titled Discharge Instructions for information provided to patient and family  Planned Readmission: no     Discharge Statement:  I spent 35 minutes discharging the patient  This time was spent on the day of discharge  I had direct contact with the patient on the day of discharge  Greater than 50% of the total time was spent examining patient, answering all patient questions, arranging and discussing plan of care with patient as well as directly providing post-discharge instructions  Additional time then spent on discharge activities      ** Please Note: This note has been constructed using a voice recognition system **

## 2021-01-04 LAB
BACTERIA BLD CULT: NORMAL
BACTERIA BLD CULT: NORMAL

## 2021-09-14 ENCOUNTER — AMB VIDEO VISIT (OUTPATIENT)
Dept: OTHER | Facility: HOSPITAL | Age: 26
End: 2021-09-14

## 2021-09-14 NOTE — CARE ANYWHERE EVISITS
Visit Summary for [facility] - Gender: Male - Date of Birth: [id number]  Date: [id number] - Duration: 21 minutes  Patient: Deannailana Carley   [facility]  Provider: Franklin Briseno    Patient Contact Information  Address  [address] [place]  [phone]    Visit Topics  My asthma  [Added By: Self - 2021-09-14]    Triage Questions   What is your current physical address in the event of a medical emergency? Answer []  Are you allergic to any medications? Answer []  Are you now or could you be pregnant? Answer []  Do you have any immune system compromise or chronic lung   disease? Answer []  Do you have any vulnerable family members in the home (infant, pregnant, cancer, elderly)? Answer []     Conversation Transcripts  [0A][0A] [Notification] You are connected with Franklin Briseno, Family Physician [0A][Notification] [facility] is located in [place]  [0A][Notification] Willie Gomezmadi has shared health history  Jenny Mort  [0A][Notification] Franklin Briseno   has added a diagnosis/procedure code  [0A][Notification] Franklin Briseno has added a prescription [0A][Notification] Franklin Briseno has added a prescription [0A][Notification] Franklin Briseno has added a prescription [0A][Notification] Franklin Briseno   has added a prescription [0A][Notification] Franklin Briseno has removed a prescription [0A][Notification] Franklin Briseno has added a prescription [0A][Notification] Franklin Briseno has added a prescription [0A][Notification] Franklin Briseno has added a   prescription [0A][Notification] Franklin Briseno has added an in person visit  [0A]    Diagnosis  Other and unspecified asthma    Procedures    Medications Prescribed    Flonase Allergy Relief  Dose : 2 sprays  Route : nasal  Frequency : every day  Until directed to stop     Patient Instructions : each nostril  Refills : 0  Instructions to the Pharmacist : Substitutions allowed    amoxicillin  Dose : 1 tablet  Route : oral  Frequency : every 12 hours  Until directed to stop  Refills : 0  Instructions to the Pharmacist : Substitutions allowed    Qvar  Dose : 1 puff  Route : inhalation  Frequency : 2 times a day  Until directed to stop  Patient Instructions : may increase to 2 puffs bid, prn  Refills : 0  Instructions to the Pharmacist : max dose  = 4 puffs/bid  brush teeth after use!!!!  Substitutions allowed    prednisone      Frequency :   Patient Instructions : 2 tabs po x 3 days, then 1 tab po x 3 days  Refills : 0  Instructions to the Pharmacist : then 1/w tab po x 3 days  Substitutions allowed    benzonatate  Dose : 1 capsule  Route : oral  Frequency : 3 times a day  As needed  Until directed to stop  Patient Instructions : prn cough  Refills : 0  Instructions to the Pharmacist : Substitutions allowed    albuterol sulfate  Dose : 3 milliliters  Route : inhalation  Frequency : 4 times a day  Until directed to stop  Patient Instructions : prn sob  Refills : 0  Instructions to the Pharmacist : Substitutions allowed      Provider Notes  [0A][0A] pa, confirmed[0A]:  [0A]1995webcam[0A]TOPICS:My asthma[0A]began 2 wks ago, worsening[0A]now sinus pain, nasal congestion, feels like a sinus infection[0A]albuterol 2 5 mg 1/4 tube, 4-5x/day[0A]requesting refill albuterol  tactile   f/n/v/d/c/chills cold or flu sx   [0A]refills requested prednisone[0A]was on daily maintenence and stopped due to insurance[0A]NAD, mild sob w/ wheeze, no cough, no chest discomfort[0A]ALLERGIES: nkda[0A]DAILY MEDS/SUPPLEMENTS: albuterol prn, zyrtec, q   indio[0A]PMH: sa/asthma[0A]SHX:[0A]weight = 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[0A]Diagnosis:[0A]asthma[0A]AR[0A]med refills, qvar[0A]no covid vaccine[0A]Treatment:[0A]restart qvar, as directed[0A]tessalon perles, as needed for cough[0A]prednisone   taper, as needed if no improvment with q indio[0A]amoxicillin, full course[0A]flonase, 2 jetts each nostril daily[0A]nasal saline[0A]steam inhalation[0A]fluids[0A]daily probioitc[0A]Please continue albuterol as directed as needed[0A]identify + eliminate   allergic triggers[0A]Patient agrees to please follow up w/ PCP or urgent care if not improving or if ANY worsening  [0A]Recommendation and instructions: Based on the current clinical protocols and the community spread of corona virus, you need to   self-quarantine for no less than 7 days from onset of corona virus symptoms  And you must have had 72 hours fever free without the use of medications (i e  fever reducer, pain reliever) and other symptoms must be improving  â¢ The Centers for Disease   Control (www cdc gov) currently recommends the following for quarantine  â¢ Stay home except to get medical care  â¢ Stay home: People who are mildly ill with COVID-19 are able to isolate at home during their illness  you should restrict activities   outside your home, except for getting medical care  â¢ Avoid public areas  Do not go to work, school or public areas  Avoid public transportation, ride-sharing, or taxis  â¢ Separate yourself from other people and animals in your home  As much as   possible, you should stay in a specific room and away from everyone[0A]home quarantine[0A]mask at  all times when outside the house[0A]social distancing[0A]patient agrees to please reconnect for follow up with covid test results asap or asap if any   symptom worsening[0A]Please reconnect or send a secure message anytime, as needed  [0A]Please send me a secure message (click the envelope on the right side of the screen) letting me know how you are doing in 3-4 days, or after the treatment has been   completed  [0A] [0A]Thank you for using [facility]  It was a pleasure speaking with you  I look forward to seeing you again for any future medical needs and i hope you will be feeling better soon! !![0A] [0A]Please print a copy of this note and send   it to your regular doctor, or take it to your next visit so it may be included in your medical record  [0A] [0A]If you need to speak to customer support, please call the following number: [0A] [0A][phone][0A] [0A]And for pharmacy related issues,   the number is [phone]  [0A]Thanks for consulting with me, I hope you will be feeling better soon! [0A]    Electronically signed by:  Ayse Espinal(NPI [id number])

## 2022-01-22 ENCOUNTER — AMB VIDEO VISIT (OUTPATIENT)
Dept: OTHER | Facility: HOSPITAL | Age: 27
End: 2022-01-22

## 2022-01-22 PROCEDURE — ECARE PR SL URGENT CARE VIRTUAL VISIT: Performed by: INTERNAL MEDICINE

## 2022-01-22 NOTE — CARE ANYWHERE EVISITS
Visit Summary for [facility] - Gender: Male - Date of Birth: [id number]  Date: [id number] - Duration: 6 minutes  Patient: Jennifer Neredia   [facility]  Provider: Berta Gallo    Patient Contact Information  Address  [address] [place]  [phone]    Visit Topics  Asthma issues [Added By: Self - 2022-01-22]    Triage Questions   What is your current physical address in the event of a medical emergency? Answer []  Are you allergic to any medications? Answer []  Are you now or could you be pregnant? Answer []  Do you have any immune system compromise or chronic lung   disease? Answer []  Do you have any vulnerable family members in the home (infant, pregnant, cancer, elderly)? Answer []     Conversation Transcripts  [0A][0A] [Notification] Soila Blanton [address] Dez Tompkins Staff, will help you prepare for your visit  He is assisting [facility] [0A][ulin Staff] q1[0A][ulin Staff] Hello, and thank you for connecting  While you are waiting for the doctor, are   there any questions I can answer for you about our service? Please contact customer service if you have questions about billing, insurance, or technical issues  Visits work best with a stable WiFi connection, so please make sure you are connected before   we begin [0A][Notification] Greg Staff has left the room  [0A][Notification] You are connected with [facility] [0A][Notification] Gretchen Lamb is located in [facility]  [0A][Notification] Gretchen Lamb has shared   health history  Delonte Du  [0A]    Diagnosis  Encounter for issue of repeat prescription    Procedures  Value: [id number] Code: CPT-4 UNLISTED E&M SERVICE    Medications Prescribed    prednisone  Dose : 1 tablet  Route : oral  Frequency : as instructed  Until directed to stop  Patient Instructions : 2 tabs po for 3 days then   Refills : 0  Instructions to the Pharmacist : 1tab po for 3 days the a half a a tablet for 3 days  Substitutions allowed    albuterol sulfate  Dose : 3 milliliters  Route : inhalation  Frequency : 4 times a day  Until directed to stop  Patient Instructions : prn shortness of breath  Refills : 0  Instructions to the Pharmacist : Substitutions allowed    ProAir HFA  Dose : 1 puff  Route : inhalation  Frequency : every 6 hours  Until directed to stop  Patient Instructions : prn  Refills : 0  Instructions to the Pharmacist : Substitutions allowed      Provider Notes  [0A][0A] Mode of Communication:  video[0A]History: [place] has  asthma and it has been acting up  for the past  7 days   he has taken mucinex , zyrtec  and he usually goes on prednisone  he needs refills on all of his medications:  pro air inhaler,    nebulizer solution 083/2 5 per 25,  and prednisone ,  he is not vaccinated// covid tests are negative  his sinuses feel fine and he does not feel he has an infection like before   [0A][0A]Past medical history: asthma/not vaccinated[0A]Medications:   reviewed[0A]Allergies:  nkda[0A]PHYSICAL EXAM: The following is taken from my personal visual & audible examination of Pt via video interface[0A]CONSTITUTIONAL: Pt is not in any acute distress - is speaking in full sentences  [0A]ORAL/OROPHARYNGEAL:   No   audible hoarseness  [0A]RESPIRATORY: Negative for audible wheezing, cough and conversational dyspnea or stridor  [0A]PSYCH: Appropriate affect  Speech is coherent in casual conversation  [0A]The remainder of the physical exam is noncontributory and/or   limited[0A]Assessment: Asthma exacerbation, needs refill [0A]Diagnosis code:  J45 901 Unspecified asthma with (acute) exacerbation     [0A]    [0A]Plan:  rest, increase fluids[0A]    Medications:   pro air inhaler   1 puff every 4-6 hours as   neededalbuterol solution for nebulizer 4 times a day as needed[0A]prednisone 20 mg:[0A]2 tab a day for 3 days[0A]1tab  for 3 days [0A]then a half of  a tablet for 3 days[0A][0A]Medical decision making: [0A]    Home care: [0A]    Avoid triggers,   including exercise, known allergens or smokers (as applicable)[0A]    Referral or follow up: [0A]    In person follow up as needed for any worsening or if no improvement in 2-3 days[0A]keep covid testing[0A] [0A]Additional recommendations: [0A]    If you   received a prescription at this visit and you have a question or problem please call [phone] for prescription assistance [0A]    Please print a copy of this note and send it to your regular doctor, or take it to your next visit so it may be   included in your medical record [0A]    Please see your primary care provider on an annual basis or more frequently if directed[0A]The patient voiced understanding and agreement with plan  [0A]    Electronically signed by: Bel Valerio(NPI [id number])

## 2022-12-09 ENCOUNTER — APPOINTMENT (OUTPATIENT)
Dept: LAB | Age: 27
End: 2022-12-09

## 2022-12-09 ENCOUNTER — AMB VIDEO VISIT (OUTPATIENT)
Dept: OTHER | Facility: HOSPITAL | Age: 27
End: 2022-12-09

## 2022-12-09 DIAGNOSIS — R36.9 PENILE DISCHARGE: ICD-10-CM

## 2022-12-09 DIAGNOSIS — R36.9 PENILE DISCHARGE: Primary | ICD-10-CM

## 2022-12-09 PROBLEM — R00.0 SINUS TACHYCARDIA: Status: RESOLVED | Noted: 2020-12-30 | Resolved: 2022-12-09

## 2022-12-09 PROBLEM — J45.901 ASTHMA EXACERBATION: Status: RESOLVED | Noted: 2020-12-30 | Resolved: 2022-12-09

## 2022-12-09 PROBLEM — R79.89 ELEVATED LACTIC ACID LEVEL: Status: RESOLVED | Noted: 2020-12-30 | Resolved: 2022-12-09

## 2022-12-09 PROBLEM — E87.20 LACTIC ACIDOSIS: Status: RESOLVED | Noted: 2020-12-30 | Resolved: 2022-12-09

## 2022-12-09 PROBLEM — J96.01 ACUTE RESPIRATORY FAILURE WITH HYPOXIA (HCC): Status: RESOLVED | Noted: 2020-12-30 | Resolved: 2022-12-09

## 2022-12-09 LAB
BILIRUB UR QL STRIP: NEGATIVE
CLARITY UR: CLEAR
COLOR UR: COLORLESS
GLUCOSE UR STRIP-MCNC: NEGATIVE MG/DL
HGB UR QL STRIP.AUTO: NEGATIVE
KETONES UR STRIP-MCNC: NEGATIVE MG/DL
LEUKOCYTE ESTERASE UR QL STRIP: NEGATIVE
NITRITE UR QL STRIP: NEGATIVE
PH UR STRIP.AUTO: 8 [PH]
PROT UR STRIP-MCNC: NEGATIVE MG/DL
SP GR UR STRIP.AUTO: 1.01 (ref 1–1.03)
UROBILINOGEN UR STRIP-ACNC: <2 MG/DL

## 2022-12-09 RX ORDER — DOXYCYCLINE 100 MG/1
100 TABLET ORAL 2 TIMES DAILY
Qty: 14 TABLET | Refills: 0 | Status: SHIPPED | OUTPATIENT
Start: 2022-12-09 | End: 2022-12-16

## 2022-12-09 NOTE — PATIENT INSTRUCTIONS
Nonspecific Urethritis in Men   WHAT YOU NEED TO KNOW:   Nonspecific urethritis is a condition that causes inflammation of the urethra  The urethra is the tube where urine passes from the bladder to the outside of the body  Men who have sex with men and those with multiple sexual partners are at a high risk of having this condition  DISCHARGE INSTRUCTIONS:   Medicines:   Antibiotics: This medicine is used to treat an infection caused by bacteria  Take them as directed  Take your medicine as directed  Contact your healthcare provider if you think your medicine is not helping or if you have side effects  Tell him of her if you are allergic to any medicine  Keep a list of the medicines, vitamins, and herbs you take  Include the amounts, and when and why you take them  Bring the list or the pill bottles to follow-up visits  Carry your medicine list with you in case of an emergency  Follow up with your doctor as directed:  Write down your questions so you remember to ask them during your visits  Manage your symptoms:   Heat:  Sit in a warm bath for 15 minutes at least 2 times a day  Do not use chemical irritants: This includes bath soaps, spermicides, or other products that may cause irritation  Prevent nonspecific urethritis:  If your urethritis was caused by an infection, the following may help to prevent the spread:  Use condoms:  Wear a condom during oral, vaginal, and anal sex  Ask for more information about the correct way to use condoms  Do not have sex with someone who has urethritis: This includes oral, vaginal, and anal sex  Do not have sex during treatment:  Do not have sex while you or your partners are being treated  Ask when it is safe to have sex  Report pregnancy:  Tell your healthcare provider if your female partner is pregnant  You may have spread an infection to her, and she may pass it to her infant during birth      Contact your healthcare provider if:   You have a fever      You have chills, a cough, or feel weak and achy  You continue to have signs or symptoms after being treated for nonspecific urethritis  You have questions or concerns about your condition or care  Return to the emergency department if:   You have joint stiffness, muscle pain, or eye inflammation  You have pain and swelling in your scrotum  You have severe abdominal pain  You have chest pain or trouble breathing  © Copyright [employer] 2022 Information is for End User's use only and may not be sold, redistributed or otherwise used for commercial purposes  All illustrations and images included in CareNotes® are the copyrighted property of [employer]  or [facility] Celeste Calzada   The above information is an  only  It is not intended as medical advice for individual conditions or treatments  Talk to your doctor, nurse or pharmacist before following any medical regimen to see if it is safe and effective for you

## 2022-12-09 NOTE — PROGRESS NOTES
Video Visit - Kade [place] 32 y o  male MRN: [id number]    REQUIRED DOCUMENTATION:         1  [facility]  2  Provider located at [address] [place] [id number] [phone]  3  [facility] provider: Cheryl Johnson PA-C   4  Identify all parties in room with patient during [facility] visit:  No one else  5  After connecting through [employer], patient was identified by name and date of birth  Patient was then informed that this was a Telemedicine visit and that the exam was being conducted confidentially over secure lines  My office door was closed  No one else was in the room  Patient acknowledged consent and understanding of privacy and security of the Telemedicine visit  I informed the patient that I have reviewed their record in Epic and presented the opportunity for them to ask any questions regarding the visit today  The patient agreed to participate  VITALS: Heart Rate: 80 BPM, Respiratory Rate: 16 RPM, Temperature 97 6° F, Blood Pressure Unavailable mmHg, Pulse Ox Unavailable % on RA    HPI  Pt reports 2 weeks started feeling a tingle in the tip of his penis  Causes urgency and frequency, minor discomfort  Notes he holds his urine often due to work  Last night noticed white discharge  Has 1 partner with whom he is sexually active  Tested last year for STI  Has appt for next Tuesday to get checked for STI  No rash, no itching, No testicular pain  Notes in 2017 had trich, but was treated  Physical Exam  Constitutional:       General: He is not in acute distress  Appearance: Normal appearance  He is not toxic-appearing  HENT:      Head: Normocephalic and atraumatic  Nose: No rhinorrhea  Mouth/Throat:      Mouth: Mucous membranes are moist    Eyes:      Conjunctiva/sclera: Conjunctivae normal    Cardiovascular:      Rate and Rhythm: Normal rate     Pulmonary:      Effort: Pulmonary effort is normal  No respiratory distress  Breath sounds: No wheezing (no gross audible wheeze through computer)  Musculoskeletal:      Cervical back: Normal range of motion  Skin:     Findings: No rash (on face or neck)  Neurological:      Mental Status: He is alert  Cranial Nerves: No dysarthria or facial asymmetry  Psychiatric:         Mood and Affect: Mood normal          Behavior: Behavior normal          Diagnoses and all orders for this visit:    Penile discharge  -     UA w Reflex to Microscopic w Reflex to Culture -Lab Collect; Future  -     Chlamydia/GC amplified DNA by PCR; Future  -     doxycycline (ADOXA) 100 MG tablet; Take 1 tablet (100 mg total) by mouth 2 (two) times a day for 7 days      Patient Instructions     Nonspecific Urethritis in Men   WHAT YOU NEED TO KNOW:   Nonspecific urethritis is a condition that causes inflammation of the urethra  The urethra is the tube where urine passes from the bladder to the outside of the body  Men who have sex with men and those with multiple sexual partners are at a high risk of having this condition  DISCHARGE INSTRUCTIONS:   Medicines:   · Antibiotics: This medicine is used to treat an infection caused by bacteria  Take them as directed  · Take your medicine as directed  Contact your healthcare provider if you think your medicine is not helping or if you have side effects  Tell him of her if you are allergic to any medicine  Keep a list of the medicines, vitamins, and herbs you take  Include the amounts, and when and why you take them  Bring the list or the pill bottles to follow-up visits  Carry your medicine list with you in case of an emergency  Follow up with your doctor as directed:  Write down your questions so you remember to ask them during your visits  Manage your symptoms:   · Heat:  Sit in a warm bath for 15 minutes at least 2 times a day  · Do not use chemical irritants:   This includes bath soaps, spermicides, or other products that may cause irritation  Prevent nonspecific urethritis:  If your urethritis was caused by an infection, the following may help to prevent the spread:  · Use condoms:  Wear a condom during oral, vaginal, and anal sex  Ask for more information about the correct way to use condoms  · Do not have sex with someone who has urethritis: This includes oral, vaginal, and anal sex  · Do not have sex during treatment:  Do not have sex while you or your partners are being treated  Ask when it is safe to have sex  · Report pregnancy:  Tell your healthcare provider if your female partner is pregnant  You may have spread an infection to her, and she may pass it to her infant during birth  Contact your healthcare provider if:   · You have a fever  · You have chills, a cough, or feel weak and achy  · You continue to have signs or symptoms after being treated for nonspecific urethritis  · You have questions or concerns about your condition or care  Return to the emergency department if:   · You have joint stiffness, muscle pain, or eye inflammation  · You have pain and swelling in your scrotum  · You have severe abdominal pain  · You have chest pain or trouble breathing  © Copyright [employer] 2022 Information is for End User's use only and may not be sold, redistributed or otherwise used for commercial purposes  All illustrations and images included in CareNotes® are the copyrighted property of [employer]  or V[employer] Bour[employer]  The above information is an  only  It is not intended as medical advice for individual conditions or treatments  Talk to your doctor, nurse or pharmacist before following any medical regimen to see if it is safe and effective for you

## 2022-12-09 NOTE — CARE ANYWHERE EVISITS
Visit Summary for [facility] - Gender: Male - Date of Birth: [id number]  Date: [id number] - Duration: 13 minutes  Patient: Darren Huber   [facility]  Provider: Sourav Aguilar PA-C    Patient Contact Information  Address  [address] [place] [address] [address]; [address]  [phone]    Visit Topics  Private discomfort  [Added By: Self - 2022-12-09]    Triage Questions   What is your current physical address in the event of a medical emergency? Answer []  Are you allergic to any medications? Answer []  Are you now or could you be pregnant? Answer []  Do you have any immune system compromise or chronic lung   disease? Answer []  Do you have any vulnerable family members in the home (infant, pregnant, cancer, elderly)? Answer []     Conversation Transcripts  [0A][0A] [Notification] You are connected with Sourav Aguilar PA-C, [facility] is located in [place]  [0A][Notification] Elieser Mishra has shared health history  Singhyuli Rodton  [0A]    Diagnosis  Urethral discharge, unspecified    Procedures  Value: [id number] Code: CPT-4 UNLISTED E&M SERVICE    Medications Prescribed    No prescriptions ordered    Electronically signed by: Karly Lucas(NPI [id number])

## 2022-12-10 LAB
C TRACH DNA SPEC QL NAA+PROBE: NEGATIVE
N GONORRHOEA DNA SPEC QL NAA+PROBE: NEGATIVE

## 2025-07-29 ENCOUNTER — OFFICE VISIT (OUTPATIENT)
Dept: URGENT CARE | Age: 30
End: 2025-07-29
Payer: COMMERCIAL

## 2025-07-29 ENCOUNTER — APPOINTMENT (OUTPATIENT)
Dept: URGENT CARE | Age: 30
End: 2025-07-29
Payer: COMMERCIAL

## 2025-07-29 VITALS
HEART RATE: 82 BPM | TEMPERATURE: 98.5 F | DIASTOLIC BLOOD PRESSURE: 86 MMHG | RESPIRATION RATE: 18 BRPM | OXYGEN SATURATION: 95 % | SYSTOLIC BLOOD PRESSURE: 124 MMHG

## 2025-07-29 DIAGNOSIS — J45.901 MILD ASTHMA WITH ACUTE EXACERBATION, UNSPECIFIED WHETHER PERSISTENT: Primary | ICD-10-CM

## 2025-07-29 PROCEDURE — 94640 AIRWAY INHALATION TREATMENT: CPT

## 2025-07-29 PROCEDURE — 99204 OFFICE O/P NEW MOD 45 MIN: CPT

## 2025-07-29 RX ORDER — ALBUTEROL SULFATE 0.83 MG/ML
2.5 SOLUTION RESPIRATORY (INHALATION) EVERY 4 HOURS PRN
Qty: 240 ML | Refills: 0 | Status: SHIPPED | OUTPATIENT
Start: 2025-07-29

## 2025-07-29 RX ORDER — PREDNISONE 20 MG/1
40 TABLET ORAL DAILY
Qty: 8 TABLET | Refills: 0 | Status: SHIPPED | OUTPATIENT
Start: 2025-07-29 | End: 2025-08-02

## 2025-07-29 RX ORDER — ALBUTEROL SULFATE 90 UG/1
2 INHALANT RESPIRATORY (INHALATION) EVERY 4 HOURS PRN
Qty: 6.7 G | Refills: 0 | Status: SHIPPED | OUTPATIENT
Start: 2025-07-29

## 2025-07-29 RX ORDER — PREDNISONE 20 MG/1
40 TABLET ORAL ONCE
Status: COMPLETED | OUTPATIENT
Start: 2025-07-29 | End: 2025-07-29

## 2025-07-29 RX ORDER — IPRATROPIUM BROMIDE AND ALBUTEROL SULFATE 2.5; .5 MG/3ML; MG/3ML
3 SOLUTION RESPIRATORY (INHALATION) ONCE
Status: COMPLETED | OUTPATIENT
Start: 2025-07-29 | End: 2025-07-29

## 2025-07-29 RX ADMIN — IPRATROPIUM BROMIDE AND ALBUTEROL SULFATE 3 ML: 2.5; .5 SOLUTION RESPIRATORY (INHALATION) at 08:41

## 2025-07-29 RX ADMIN — PREDNISONE 40 MG: 20 TABLET ORAL at 08:41
